# Patient Record
Sex: MALE | Race: WHITE | NOT HISPANIC OR LATINO
[De-identification: names, ages, dates, MRNs, and addresses within clinical notes are randomized per-mention and may not be internally consistent; named-entity substitution may affect disease eponyms.]

---

## 2018-01-16 ENCOUNTER — BH HISTORICAL (OUTPATIENT)
Dept: OTHER | Age: 33
End: 2018-01-16

## 2018-01-18 ENCOUNTER — BH HISTORICAL (OUTPATIENT)
Dept: OTHER | Age: 33
End: 2018-01-18

## 2019-02-26 ENCOUNTER — WALK IN (OUTPATIENT)
Dept: URGENT CARE | Age: 34
End: 2019-02-26

## 2019-02-26 ENCOUNTER — TELEPHONE (OUTPATIENT)
Dept: SCHEDULING | Age: 34
End: 2019-02-26

## 2019-02-26 VITALS
WEIGHT: 205 LBS | HEIGHT: 68 IN | HEART RATE: 70 BPM | TEMPERATURE: 97.8 F | DIASTOLIC BLOOD PRESSURE: 74 MMHG | SYSTOLIC BLOOD PRESSURE: 104 MMHG | BODY MASS INDEX: 31.07 KG/M2 | RESPIRATION RATE: 18 BRPM

## 2019-02-26 DIAGNOSIS — R09.82 POST-NASAL DRIP: ICD-10-CM

## 2019-02-26 DIAGNOSIS — J02.9 PHARYNGITIS, UNSPECIFIED ETIOLOGY: ICD-10-CM

## 2019-02-26 DIAGNOSIS — J02.9 SORE THROAT: Primary | ICD-10-CM

## 2019-02-26 LAB
INTERNAL PROCEDURAL CONTROLS ACCEPTABLE: YES
S PYO AG THROAT QL IA.RAPID: NEGATIVE

## 2019-02-26 PROCEDURE — 87880 STREP A ASSAY W/OPTIC: CPT | Performed by: NURSE PRACTITIONER

## 2019-02-26 PROCEDURE — 99203 OFFICE O/P NEW LOW 30 MIN: CPT | Performed by: NURSE PRACTITIONER

## 2019-02-26 ASSESSMENT — ENCOUNTER SYMPTOMS
WHEEZING: 0
COUGH: 0
CHILLS: 0
SINUS PAIN: 0
DIARRHEA: 0
NAUSEA: 1
EYE DISCHARGE: 0
VOMITING: 0
BLURRED VISION: 0
SORE THROAT: 1
EYE PAIN: 0
SHORTNESS OF BREATH: 0
FEVER: 0
ABDOMINAL PAIN: 0

## 2021-09-20 ENCOUNTER — HOSPITAL ENCOUNTER (OUTPATIENT)
Dept: GENERAL RADIOLOGY | Age: 36
Discharge: HOME | End: 2021-09-20
Attending: NURSE PRACTITIONER
Payer: COMMERCIAL

## 2021-09-20 DIAGNOSIS — M54.6 CHRONIC BILATERAL THORACIC BACK PAIN: ICD-10-CM

## 2021-09-20 DIAGNOSIS — G89.29 CHRONIC BILATERAL THORACIC BACK PAIN: ICD-10-CM

## 2021-09-20 DIAGNOSIS — Z87.81 HISTORY OF SPINAL FRACTURE: ICD-10-CM

## 2021-09-20 PROBLEM — K29.30 CHRONIC SUPERFICIAL GASTRITIS WITHOUT BLEEDING: Status: ACTIVE | Noted: 2017-08-21

## 2021-09-20 PROBLEM — F41.9 ANXIETY: Status: ACTIVE | Noted: 2017-08-21

## 2021-09-20 PROBLEM — F60.3 BORDERLINE PERSONALITY DISORDER IN ADULT (HCC): Status: ACTIVE | Noted: 2021-09-20

## 2021-09-20 PROBLEM — F32.A DEPRESSION: Status: ACTIVE | Noted: 2021-09-20

## 2021-09-20 PROBLEM — M54.50 LUMBAR BACK PAIN: Status: ACTIVE | Noted: 2021-09-20

## 2021-09-20 PROCEDURE — 72072 X-RAY EXAM THORAC SPINE 3VWS: CPT | Performed by: NURSE PRACTITIONER

## 2021-09-20 NOTE — PROGRESS NOTES
Chief Complaint:   Patient presents with:  Establish Care: personality disorder      HPI:   This is a 31year old male coming in to establish care and for multiple concerns. [place] recently moved back to [place] from [place] [address].  He has a hx of borderline personality d History:  Family History   Problem Relation Age of Onset   • Colon Cancer Paternal Grandfather      Allergies:    Seasonal                OTHER (SEE COMMENTS)  Current Meds:  Current Outpatient Medications   Medication Sig Dispense Refill   • Theanine 200 following:    Height as of this encounter: 5' 8\" (1.727 m). Weight as of this encounter: 165 lb 6.4 oz (75 kg). Vital signs reviewed. Appears stated age, well groomed, in no acute distress.   Physical Exam:  GEN:  Patient is alert, awake and oriented, THERAPY ([facility]) - INTERNAL REFERRAL    6. Chronic neck pain  -See above. - OP REFERRAL PAIN MANGEMENT  - RHEUMATOLOGY - INTERNAL  - ACUPUNCTURE THERAPY ([facility]) - INTERNAL REFERRAL  - MASSAGE THERAPY ([facility]) - INTERNAL REFERRAL    7.  History of sp

## 2021-09-27 NOTE — PROGRESS NOTES
Chief Complaint:   Patient presents with: Follow - Up: short term disability. want to gradually start working his way up to full time      HPI:   This is a 31year old male coming in to complete disability paperwork to return to work.  He has been on short (GLUCOSAMINE CHOND CMP ADVANCED OR)      • Magnesium Bisglycinate Dihyd Does not apply Powder      • Cholestyramine 4 g Oral Powd Pack Take 1 packet by mouth As Directed.         Counseling given: Not Answered       REVIEW OF SYSTEMS:   CONSTITUTIONAL:  Homar Fuentes rales/rhonchi/wheezing. ABDOMEN:  Soft, nondistended, nontender, bowel sounds normal in all 4 quadrants. EXTREMITIES:  No edema, no cyanosis, no clubbing. NEURO:  No deficit, normal gait, strength and tone, sensory intact. ASSESSMENT AND PLAN:   1.  B

## 2021-10-08 ENCOUNTER — OFFICE VISIT (OUTPATIENT)
Dept: RHEUMATOLOGY | Facility: CLINIC | Age: 36
End: 2021-10-08
Payer: COMMERCIAL

## 2021-10-08 VITALS
WEIGHT: 173 LBS | BODY MASS INDEX: 26.22 KG/M2 | HEART RATE: 70 BPM | HEIGHT: 68 IN | DIASTOLIC BLOOD PRESSURE: 65 MMHG | SYSTOLIC BLOOD PRESSURE: 104 MMHG

## 2021-10-08 DIAGNOSIS — M54.2 NECK PAIN: Primary | ICD-10-CM

## 2021-10-08 DIAGNOSIS — G89.29 CHRONIC BILATERAL LOW BACK PAIN WITHOUT SCIATICA: ICD-10-CM

## 2021-10-08 DIAGNOSIS — M54.50 CHRONIC BILATERAL LOW BACK PAIN WITHOUT SCIATICA: ICD-10-CM

## 2021-10-08 PROCEDURE — 3008F BODY MASS INDEX DOCD: CPT | Performed by: INTERNAL MEDICINE

## 2021-10-08 PROCEDURE — 99244 OFF/OP CNSLTJ NEW/EST MOD 40: CPT | Performed by: INTERNAL MEDICINE

## 2021-10-08 PROCEDURE — 3078F DIAST BP <80 MM HG: CPT | Performed by: INTERNAL MEDICINE

## 2021-10-08 PROCEDURE — 3074F SYST BP LT 130 MM HG: CPT | Performed by: INTERNAL MEDICINE

## 2021-10-08 NOTE — PROGRESS NOTES
Jayson Anderson is a 31year old male who presents for Patient presents with:  Consult  Back Pain  Shoulder Pain  Hip Pain  .    HPI:   CC: back pain   Consult: referred by NALDO Clarke    This is a 32 yo M with hx of Anxiety/Depression, Borderline p Take 1/2  tablet in the morning and 1 at night.      • OXcarbazepine 300 MG Oral Tab      • omega-3 fatty acids 1000 MG Oral Cap      • Multiple Vitamins-Minerals (MULTIVITAL OR)      • melatonin 5 MG Oral Cap      • Misc Natural Products (GLUCOSAMINE CHOND increased work of breathing  ABDOMEN:  soft NT/ND, +BS, no HSM  SKIN: No rashes or skin lesions.  No nail findings  MSK:  No swelling or synovitis on exam, +FROM in all joints  TTP along the paraspinal region of the cervical spine  NEURO: Cranial nerves II-

## 2021-10-08 NOTE — PATIENT INSTRUCTIONS
You were seen today for back pain mostly in the neck and lower back  It does seem more mechanical, will work you up for autoimmune causes but my suspicion is low  X-ray of your neck and lower back and blood work on Monday  Also referred you to the physiatr

## 2021-10-18 ENCOUNTER — OFFICE VISIT (OUTPATIENT)
Dept: SURGERY | Facility: CLINIC | Age: 36
End: 2021-10-18
Payer: COMMERCIAL

## 2021-10-18 ENCOUNTER — LAB ENCOUNTER (OUTPATIENT)
Dept: LAB | Age: 36
End: 2021-10-18
Attending: UROLOGY
Payer: COMMERCIAL

## 2021-10-18 VITALS
HEART RATE: 57 BPM | BODY MASS INDEX: 25.76 KG/M2 | WEIGHT: 170 LBS | HEIGHT: 68 IN | DIASTOLIC BLOOD PRESSURE: 63 MMHG | SYSTOLIC BLOOD PRESSURE: 104 MMHG

## 2021-10-18 DIAGNOSIS — R35.0 URINARY FREQUENCY: ICD-10-CM

## 2021-10-18 DIAGNOSIS — R79.89 LOW TESTOSTERONE: Primary | ICD-10-CM

## 2021-10-18 PROCEDURE — 3078F DIAST BP <80 MM HG: CPT | Performed by: UROLOGY

## 2021-10-18 PROCEDURE — 99244 OFF/OP CNSLTJ NEW/EST MOD 40: CPT | Performed by: UROLOGY

## 2021-10-18 PROCEDURE — 3074F SYST BP LT 130 MM HG: CPT | Performed by: UROLOGY

## 2021-10-18 PROCEDURE — 81001 URINALYSIS AUTO W/SCOPE: CPT

## 2021-10-18 PROCEDURE — 3008F BODY MASS INDEX DOCD: CPT | Performed by: UROLOGY

## 2021-10-18 RX ORDER — TAMSULOSIN HYDROCHLORIDE 0.4 MG/1
0.4 CAPSULE ORAL DAILY
Qty: 30 CAPSULE | Refills: 11 | Status: SHIPPED | OUTPATIENT
Start: 2021-10-18 | End: 2021-11-17

## 2021-10-18 NOTE — PROGRESS NOTES
SUBJECTIVE:  Antony Alexander is a 31year old male who presents for a consultation at the request of, and a copy of this note will be sent to, Dr. Pleasant Cheadle, for evaluation of  urinary frquency and low testosterone.   He states he was started on psy Size: adult)   Pulse 57   Ht 5' 8\" (1.727 m)   Wt 170 lb (77.1 kg)   BMI 25.85 kg/m²   He appears well, in no apparent distress. Alert and oriented times three, pleasant and cooperative.   CARDIOVASCULAR:normal apical impulse  RESPIRATORY: no chest wall d

## 2021-10-19 ENCOUNTER — LAB ENCOUNTER (OUTPATIENT)
Dept: LAB | Facility: HOSPITAL | Age: 36
End: 2021-10-19
Attending: INTERNAL MEDICINE
Payer: COMMERCIAL

## 2021-10-19 DIAGNOSIS — G89.29 CHRONIC BILATERAL LOW BACK PAIN WITHOUT SCIATICA: ICD-10-CM

## 2021-10-19 DIAGNOSIS — M54.50 CHRONIC BILATERAL LOW BACK PAIN WITHOUT SCIATICA: ICD-10-CM

## 2021-10-19 DIAGNOSIS — M54.2 NECK PAIN: ICD-10-CM

## 2021-10-19 DIAGNOSIS — R79.89 LOW TESTOSTERONE: ICD-10-CM

## 2021-10-19 PROCEDURE — 86812 HLA TYPING A B OR C: CPT

## 2021-10-19 PROCEDURE — 84402 ASSAY OF FREE TESTOSTERONE: CPT

## 2021-10-19 PROCEDURE — 84403 ASSAY OF TOTAL TESTOSTERONE: CPT

## 2021-10-19 PROCEDURE — 36415 COLL VENOUS BLD VENIPUNCTURE: CPT

## 2021-10-21 ENCOUNTER — TELEPHONE (OUTPATIENT)
Dept: RHEUMATOLOGY | Facility: CLINIC | Age: 36
End: 2021-10-21

## 2021-10-21 ENCOUNTER — HOSPITAL ENCOUNTER (OUTPATIENT)
Dept: GENERAL RADIOLOGY | Facility: HOSPITAL | Age: 36
Discharge: HOME | End: 2021-10-21
Attending: INTERNAL MEDICINE
Payer: COMMERCIAL

## 2021-10-21 DIAGNOSIS — M54.2 NECK PAIN: ICD-10-CM

## 2021-10-21 DIAGNOSIS — M54.50 CHRONIC BILATERAL LOW BACK PAIN WITHOUT SCIATICA: ICD-10-CM

## 2021-10-21 DIAGNOSIS — G89.29 CHRONIC BILATERAL LOW BACK PAIN WITHOUT SCIATICA: ICD-10-CM

## 2021-10-21 PROCEDURE — 72110 X-RAY EXAM L-2 SPINE 4/>VWS: CPT | Performed by: INTERNAL MEDICINE

## 2021-10-21 PROCEDURE — 72202 X-RAY EXAM SI JOINTS 3/> VWS: CPT | Performed by: INTERNAL MEDICINE

## 2021-10-21 PROCEDURE — 72050 X-RAY EXAM NECK SPINE 4/5VWS: CPT | Performed by: INTERNAL MEDICINE

## 2021-10-21 NOTE — TELEPHONE ENCOUNTER
Spoke to patient, informed of Dr. Vera Jordan results note. He verbalized understanding.  central scheduling number provided

## 2021-10-21 NOTE — TELEPHONE ENCOUNTER
Per patient he received his blood test result in his Mychart and waiting somebody to explain about it.

## 2021-10-22 ENCOUNTER — TELEPHONE (OUTPATIENT)
Dept: RHEUMATOLOGY | Facility: CLINIC | Age: 36
End: 2021-10-22

## 2021-10-22 DIAGNOSIS — M54.50 CHRONIC BILATERAL LOW BACK PAIN WITHOUT SCIATICA: Primary | ICD-10-CM

## 2021-10-22 DIAGNOSIS — G89.29 CHRONIC BILATERAL LOW BACK PAIN WITHOUT SCIATICA: Primary | ICD-10-CM

## 2021-10-26 NOTE — TELEPHONE ENCOUNTER
PA approved via [employer] #[id number] valid 10/26/2021 to 12/24/21. Left message for pt to schedule MRI and copy of order mailed to pt.

## 2021-11-08 ENCOUNTER — HOSPITAL ENCOUNTER (OUTPATIENT)
Dept: MRI IMAGING | Age: 36
Discharge: HOME | End: 2021-11-08
Attending: INTERNAL MEDICINE
Payer: COMMERCIAL

## 2021-11-08 DIAGNOSIS — G89.29 CHRONIC BILATERAL LOW BACK PAIN WITHOUT SCIATICA: ICD-10-CM

## 2021-11-08 DIAGNOSIS — M54.50 CHRONIC BILATERAL LOW BACK PAIN WITHOUT SCIATICA: ICD-10-CM

## 2021-11-08 PROCEDURE — 72195 MRI PELVIS W/O DYE: CPT | Performed by: INTERNAL MEDICINE

## 2021-11-10 ENCOUNTER — TELEPHONE (OUTPATIENT)
Dept: RHEUMATOLOGY | Facility: CLINIC | Age: 36
End: 2021-11-10

## 2021-11-10 DIAGNOSIS — R19.7 DIARRHEA, UNSPECIFIED TYPE: Primary | ICD-10-CM

## 2021-11-11 NOTE — TELEPHONE ENCOUNTER
Discussed results with patient. MRI of the SI joint was normal.  No evidence of inflammatory back pain. It did  some colitis on his MRI.   We will be referring to GI

## 2021-11-22 PROBLEM — M53.3 COCCYX PAIN: Status: ACTIVE | Noted: 2019-10-14

## 2021-11-22 PROBLEM — K64.9 HEMORRHOIDS: Status: ACTIVE | Noted: 2019-10-14

## 2021-11-22 PROBLEM — M25.511 SHOULDER PAIN, BILATERAL: Status: ACTIVE | Noted: 2021-11-22

## 2021-11-22 PROBLEM — F33.2 SEVERE EPISODE OF RECURRENT MAJOR DEPRESSIVE DISORDER, WITHOUT PSYCHOTIC FEATURES (HCC): Status: ACTIVE | Noted: 2019-10-14

## 2021-11-22 PROBLEM — K29.70 GASTRITIS: Status: ACTIVE | Noted: 2019-10-14

## 2021-11-22 PROBLEM — M25.512 SHOULDER PAIN, BILATERAL: Status: ACTIVE | Noted: 2021-11-22

## 2021-11-22 PROBLEM — R35.0 URINARY FREQUENCY: Status: ACTIVE | Noted: 2021-11-22

## 2021-11-22 PROBLEM — F60.3 BORDERLINE PERSONALITY DISORDER IN ADULT (HCC): Status: RESOLVED | Noted: 2021-09-20 | Resolved: 2021-11-22

## 2021-11-22 PROBLEM — F33.1 MODERATE EPISODE OF RECURRENT MAJOR DEPRESSIVE DISORDER (HCC): Status: ACTIVE | Noted: 2017-08-21

## 2021-11-22 PROBLEM — M25.551 RIGHT HIP PAIN: Status: ACTIVE | Noted: 2017-08-21

## 2022-04-05 PROBLEM — F41.1 GAD (GENERALIZED ANXIETY DISORDER): Status: ACTIVE | Noted: 2022-04-05

## 2023-02-13 ENCOUNTER — OFFICE VISIT (OUTPATIENT)
Dept: FAMILY MEDICINE CLINIC | Facility: CLINIC | Age: 38
End: 2023-02-13
Payer: COMMERCIAL

## 2023-02-13 VITALS
BODY MASS INDEX: 32.74 KG/M2 | OXYGEN SATURATION: 97 % | SYSTOLIC BLOOD PRESSURE: 108 MMHG | WEIGHT: 216 LBS | HEART RATE: 67 BPM | DIASTOLIC BLOOD PRESSURE: 62 MMHG | RESPIRATION RATE: 16 BRPM | HEIGHT: 68 IN

## 2023-02-13 DIAGNOSIS — R04.0 FREQUENT NOSEBLEEDS: ICD-10-CM

## 2023-02-13 DIAGNOSIS — G89.29 CHRONIC NECK PAIN: ICD-10-CM

## 2023-02-13 DIAGNOSIS — Z00.00 ADULT GENERAL MEDICAL EXAMINATION: Primary | ICD-10-CM

## 2023-02-13 DIAGNOSIS — M54.2 CHRONIC NECK PAIN: ICD-10-CM

## 2023-02-13 DIAGNOSIS — M54.50 LUMBAR BACK PAIN: ICD-10-CM

## 2023-02-13 DIAGNOSIS — M54.6 CHRONIC BILATERAL THORACIC BACK PAIN: ICD-10-CM

## 2023-02-13 DIAGNOSIS — G89.29 CHRONIC BILATERAL THORACIC BACK PAIN: ICD-10-CM

## 2023-02-13 DIAGNOSIS — Z23 ENCOUNTER FOR IMMUNIZATION: ICD-10-CM

## 2023-02-13 RX ORDER — SILDENAFIL 25 MG/1
50 TABLET, FILM COATED ORAL
COMMUNITY

## 2023-05-15 ENCOUNTER — OFFICE VISIT (OUTPATIENT)
Dept: FAMILY MEDICINE CLINIC | Facility: CLINIC | Age: 38
End: 2023-05-15
Payer: COMMERCIAL

## 2023-05-15 VITALS
OXYGEN SATURATION: 98 % | RESPIRATION RATE: 18 BRPM | HEART RATE: 61 BPM | TEMPERATURE: 98 F | SYSTOLIC BLOOD PRESSURE: 110 MMHG | BODY MASS INDEX: 33.65 KG/M2 | DIASTOLIC BLOOD PRESSURE: 68 MMHG | HEIGHT: 68 IN | WEIGHT: 222 LBS

## 2023-05-15 DIAGNOSIS — R09.81 NASAL CONGESTION: ICD-10-CM

## 2023-05-15 DIAGNOSIS — J02.9 SORE THROAT: Primary | ICD-10-CM

## 2023-05-15 DIAGNOSIS — R05.1 ACUTE COUGH: ICD-10-CM

## 2023-05-15 PROCEDURE — 3074F SYST BP LT 130 MM HG: CPT | Performed by: NURSE PRACTITIONER

## 2023-05-15 PROCEDURE — 3008F BODY MASS INDEX DOCD: CPT | Performed by: NURSE PRACTITIONER

## 2023-05-15 PROCEDURE — 3078F DIAST BP <80 MM HG: CPT | Performed by: NURSE PRACTITIONER

## 2023-05-15 PROCEDURE — 99213 OFFICE O/P EST LOW 20 MIN: CPT | Performed by: NURSE PRACTITIONER

## 2023-05-15 NOTE — PATIENT INSTRUCTIONS
Claritin or Zyrtec over-the-counter daily (generic is OK)  Flonase nasal spray 1 spray in each nostril twice daily (generic OK)  Mucinex as needed for cough/congestion  Ibuprofen/Tylenol needed for pain  Humidifier, fluids, rest

## 2023-08-17 ENCOUNTER — OFFICE VISIT (OUTPATIENT)
Dept: OTOLARYNGOLOGY | Facility: CLINIC | Age: 38
End: 2023-08-17

## 2023-08-17 VITALS — HEIGHT: 68 IN | BODY MASS INDEX: 33.65 KG/M2 | WEIGHT: 222 LBS

## 2023-08-17 DIAGNOSIS — M26.623 BILATERAL TEMPOROMANDIBULAR JOINT PAIN: ICD-10-CM

## 2023-08-17 DIAGNOSIS — J34.3 NASAL TURBINATE HYPERTROPHY: ICD-10-CM

## 2023-08-17 DIAGNOSIS — J45.991 COUGH VARIANT ASTHMA: Primary | ICD-10-CM

## 2023-08-17 PROCEDURE — 3008F BODY MASS INDEX DOCD: CPT | Performed by: SPECIALIST

## 2023-08-17 PROCEDURE — 99243 OFF/OP CNSLTJ NEW/EST LOW 30: CPT | Performed by: SPECIALIST

## 2023-08-18 NOTE — PATIENT INSTRUCTIONS
Pulmonary function test was ordered to better evaluate you for possible cough variant asthma. I will call you with the results. We held off on the Singulair secondary to the possible side effects of depression. I did asked you to try an over-the-counter Claritin. You were given a handout on TMJ arthralgia. If this is not helpful for you, physical therapy can also be considered. Follow-up with any additional questions or problems.

## 2023-08-31 ENCOUNTER — OFFICE VISIT (OUTPATIENT)
Facility: CLINIC | Age: 38
End: 2023-08-31

## 2023-08-31 ENCOUNTER — TELEPHONE (OUTPATIENT)
Facility: CLINIC | Age: 38
End: 2023-08-31

## 2023-08-31 VITALS
HEART RATE: 66 BPM | DIASTOLIC BLOOD PRESSURE: 73 MMHG | SYSTOLIC BLOOD PRESSURE: 111 MMHG | BODY MASS INDEX: 32.13 KG/M2 | WEIGHT: 212 LBS | HEIGHT: 68 IN

## 2023-08-31 DIAGNOSIS — R10.13 EPIGASTRIC PAIN: Primary | ICD-10-CM

## 2023-09-06 ENCOUNTER — TELEPHONE (OUTPATIENT)
Dept: FAMILY MEDICINE CLINIC | Facility: CLINIC | Age: 38
End: 2023-09-06

## 2023-09-06 ENCOUNTER — LAB ENCOUNTER (OUTPATIENT)
Dept: LAB | Facility: HOSPITAL | Age: 38
End: 2023-09-06
Attending: INTERNAL MEDICINE
Payer: COMMERCIAL

## 2023-09-06 DIAGNOSIS — R10.13 EPIGASTRIC PAIN: ICD-10-CM

## 2023-09-06 DIAGNOSIS — Z00.00 ADULT GENERAL MEDICAL EXAMINATION: Primary | ICD-10-CM

## 2023-09-06 PROCEDURE — 88305 TISSUE EXAM BY PATHOLOGIST: CPT | Performed by: INTERNAL MEDICINE

## 2023-09-06 PROCEDURE — 87338 HPYLORI STOOL AG IA: CPT

## 2023-09-06 PROCEDURE — 88312 SPECIAL STAINS GROUP 1: CPT | Performed by: INTERNAL MEDICINE

## 2023-09-07 ENCOUNTER — TELEPHONE (OUTPATIENT)
Facility: CLINIC | Age: 38
End: 2023-09-07

## 2023-09-07 LAB — H PYLORI AG STL QL IA: NEGATIVE

## 2023-09-07 NOTE — TELEPHONE ENCOUNTER
----- Message from Luis A Winter MD sent at 9/7/2023  4:28 PM CDT -----  Reviewed biopsies. No h pylori. Please arrange for clinic follow-up in 3-6 months.

## 2023-09-07 NOTE — TELEPHONE ENCOUNTER
I ordered testosterone but please advise patient it may not be covered without a reason for ordering (I coded under Adult Exam-nothing has been discussed in past visits about any other issue that would justify it).

## 2023-09-11 ENCOUNTER — TELEMEDICINE (OUTPATIENT)
Dept: FAMILY MEDICINE CLINIC | Facility: CLINIC | Age: 38
End: 2023-09-11

## 2023-09-11 DIAGNOSIS — N52.9 ERECTILE DYSFUNCTION, UNSPECIFIED ERECTILE DYSFUNCTION TYPE: ICD-10-CM

## 2023-09-11 DIAGNOSIS — N40.0 BENIGN PROSTATIC HYPERPLASIA, UNSPECIFIED WHETHER LOWER URINARY TRACT SYMPTOMS PRESENT: ICD-10-CM

## 2023-09-11 DIAGNOSIS — R53.83 OTHER FATIGUE: ICD-10-CM

## 2023-09-11 DIAGNOSIS — R79.89 LOW TESTOSTERONE IN MALE: Primary | ICD-10-CM

## 2023-09-11 PROCEDURE — 99213 OFFICE O/P EST LOW 20 MIN: CPT | Performed by: NURSE PRACTITIONER

## 2023-09-11 NOTE — PROGRESS NOTES
Due to the real risk of possible exposure to Coronavirus (CoV-2, COVID-19) in the office/medical building and recommendations for social distancing (key to mitigation/limiting the spread of the virus) a Virtual or Telemedicine visit over the phone was performed as below. Patient has consented to the Virtual/Telephone Check-In service and expresses understanding and accepts financial responsibility for any deductible, co-insurance and/or co-pays associated with this service. Telehealth outside of [address] Verbal Consent   I conducted a telehealth visit with Francine Marshall today, 09/11/23, which was completed using two-way, real-time interactive audio and video communication. This has been done in good hermelinda to provide continuity of care in the best interest of the provider-patient relationship, due to the COVID -19 public health crisis/national emergency where restrictions of face-to-face office visits are ongoing. Every conscious effort was taken to allow for sufficient and adequate time to complete the visit. The patient was made aware of the limitations of the telehealth visit, including treatment limitations as no physical exam could be performed. The patient was advised to call 911 or to go to the ER in case there was an emergency. The patient was also advised of the potential privacy & security concerns related to the telehealth platform. The patient was made aware of where to find [facility] notice of privacy practices, telehealth consent form and other related consent forms and documents. which are located on the [facility] website. The patient verbally agreed to telehealth consent form, related consents and the risks discussed. Lastly, the patient confirmed that they were in [place]. Included in this visit, time may have been spent reviewing labs, medications, radiology tests and decision making.  Appropriate medical decision-making and tests are ordered as detailed in the plan of care above.  Coding/billing information is submitted for this visit based on complexity of care and/or time spent for the visit. HPI:  Patient presents with: Follow - Up: Fatigue, testosterone questions      Rupinder Dodd is a 30year old male who calls for complaints of:    Requests to have testosterone checked. Hx of borderline low testosterone in 2021 per patient, level was approx. 293 per his recollection. Report not available to me. He did not receive treatment but started a supplement to help with this. He stopped the supplement about 3 weeks ago due to concern that it may harm kidney/liver after finding info online about this. Has some fatigue daily. Not sure if this is related to psychiatric medications vs other. Sees psychiatry. Having panic attacks most nights around 2 AM, will talk to provider about this. Otherwise sleeps about 8 hours but does not feel rested upon awakening. Does not snore. Reports hx of BPH diagnosed at age 27, has seen urology in the past. Has some difficulty getting and maintaining erections and reports a low libido. Does not always wake up with an erection. ROS:  GEN: Denies fever, chills. +Fatigue, see above. CARDIOVASCULAR: Denies chest pain, dyspnea. RESPIRATORY: Denies cough, dyspnea. GI: Denies n/v/d/c, appetite normal.  NEURO: Denies headaches, dizziness. Physical Exam:  GEN:  Patient is alert, awake and oriented, well developed, well nourished. LUNGS: Patient speaks clearly in full sentences without dyspnea, no cough while on video visit. PSYCH: Appropriate mood and affect. Seasonal                OTHER (SEE COMMENTS)  Current Outpatient Medications   Medication Sig Dispense Refill    pantoprazole 40 MG Oral Tab EC Take 1 tablet (40 mg total) by mouth every morning before breakfast. 30 tablet 3    BERBERINE CHLORIDE OR Take by mouth. LD: 9/2/2023      vortioxetine (TRINTELLIX) 20 MG Oral Tab Take 1 tablet (20 mg total) by mouth daily.  90 tablet 1 Sildenafil Citrate 25 MG Oral Tab Take 2 tablets (50 mg total) by mouth daily as needed for Erectile Dysfunction. NON FORMULARY Jeni Yepez      Theanine 200 MG Oral Cap       omega-3 fatty acids 1000 MG Oral Cap       Misc Natural Products (GLUCOSAMINE CHOND CMP ADVANCED OR)       Magnesium Bisglycinate Dihyd Does not apply Powder        Past Medical History:   Diagnosis Date    Anxiety     Borderline personality disorder in adult [facility]) 09/20/2021    Visual impairment      Past Surgical History:   Procedure Laterality Date    UPPER GI ENDOSCOPY,EXAM           ASSESSMENT AND PLAN:   1. Patient is a 30year old male who calls for: Lab request, fatigue    Additional Assessment and Plan:  1. Low testosterone in male  -Will check testosterone in addition to previously ordered labs. Follow-up 2-4 weeks for discussion of results/treatment. - TESTOSTERONE,TOTAL AND FREE; Future    2. Erectile dysfunction, unspecified erectile dysfunction type  -On Viagra PRN. Follow-up with urology. - TESTOSTERONE,TOTAL AND FREE; Future    3. Benign prostatic hyperplasia, unspecified whether lower urinary tract symptoms present  -See above. - TESTOSTERONE,TOTAL AND FREE; Future    4. Other fatigue  -See above. Will speak with psychiatry about panic attacks. Aim for 8-9 hours good quality sleep each night, regular exercise, healthy diet, fresh air daily.  - TESTOSTERONE,TOTAL AND FREE; Future        Follow up with me 2-4 weeks    Call and/or go to the ER if worsening symptoms including, but not limited to: respiratory distress, shortness of breath and  wheezing, worsening fever, cough and mental status. The patient (or patient's parent if <19 y/o) indicates understanding of the above recommendations and agrees to the above plan.     Orders Placed This Encounter      Testosterone, Total And Free Male [E]      Meds & Refills for this Visit:  Requested Prescriptions      No prescriptions requested or ordered in this encounter Imaging & Consults:  None    DEMARCUS Cardoso     Time spent during encounter: 15 minutes

## 2023-09-14 ENCOUNTER — LAB ENCOUNTER (OUTPATIENT)
Dept: LAB | Facility: HOSPITAL | Age: 38
End: 2023-09-14
Attending: NURSE PRACTITIONER
Payer: COMMERCIAL

## 2023-09-14 ENCOUNTER — TELEPHONE (OUTPATIENT)
Facility: CLINIC | Age: 38
End: 2023-09-14

## 2023-09-14 DIAGNOSIS — N40.0 BENIGN PROSTATIC HYPERPLASIA, UNSPECIFIED WHETHER LOWER URINARY TRACT SYMPTOMS PRESENT: ICD-10-CM

## 2023-09-14 DIAGNOSIS — R79.89 LOW TESTOSTERONE IN MALE: ICD-10-CM

## 2023-09-14 DIAGNOSIS — N52.9 ERECTILE DYSFUNCTION, UNSPECIFIED ERECTILE DYSFUNCTION TYPE: ICD-10-CM

## 2023-09-14 DIAGNOSIS — R53.83 OTHER FATIGUE: ICD-10-CM

## 2023-09-14 DIAGNOSIS — Z00.00 ADULT GENERAL MEDICAL EXAMINATION: ICD-10-CM

## 2023-09-14 LAB
ALBUMIN SERPL-MCNC: 4.1 G/DL (ref 3.4–5)
ALBUMIN/GLOB SERPL: 1.2 {RATIO} (ref 1–2)
ALP LIVER SERPL-CCNC: 65 U/L
ALT SERPL-CCNC: 29 U/L
ANION GAP SERPL CALC-SCNC: 3 MMOL/L (ref 0–18)
AST SERPL-CCNC: 17 U/L (ref 15–37)
BASOPHILS # BLD AUTO: 0.03 X10(3) UL (ref 0–0.2)
BASOPHILS NFR BLD AUTO: 0.9 %
BILIRUB SERPL-MCNC: 0.5 MG/DL (ref 0.1–2)
BUN BLD-MCNC: 19 MG/DL (ref 7–18)
BUN/CREAT SERPL: 17.6 (ref 10–20)
CALCIUM BLD-MCNC: 9.2 MG/DL (ref 8.5–10.1)
CHLORIDE SERPL-SCNC: 109 MMOL/L (ref 98–112)
CHOLEST SERPL-MCNC: 175 MG/DL (ref ?–200)
CO2 SERPL-SCNC: 30 MMOL/L (ref 21–32)
CREAT BLD-MCNC: 1.08 MG/DL
DEPRECATED RDW RBC AUTO: 44 FL (ref 35.1–46.3)
EGFRCR SERPLBLD CKD-EPI 2021: 92 ML/MIN/1.73M2 (ref 60–?)
EOSINOPHIL # BLD AUTO: 0.05 X10(3) UL (ref 0–0.7)
EOSINOPHIL NFR BLD AUTO: 1.4 %
ERYTHROCYTE [DISTWIDTH] IN BLOOD BY AUTOMATED COUNT: 12.8 % (ref 11–15)
EST. AVERAGE GLUCOSE BLD GHB EST-MCNC: 97 MG/DL (ref 68–126)
FASTING PATIENT LIPID ANSWER: YES
FASTING STATUS PATIENT QL REPORTED: YES
GLOBULIN PLAS-MCNC: 3.4 G/DL (ref 2.8–4.4)
GLUCOSE BLD-MCNC: 92 MG/DL (ref 70–99)
HBA1C MFR BLD: 5 % (ref ?–5.7)
HCT VFR BLD AUTO: 46.5 %
HDLC SERPL-MCNC: 65 MG/DL (ref 40–59)
HGB BLD-MCNC: 15.3 G/DL
IMM GRANULOCYTES # BLD AUTO: 0.01 X10(3) UL (ref 0–1)
IMM GRANULOCYTES NFR BLD: 0.3 %
LDLC SERPL CALC-MCNC: 98 MG/DL (ref ?–100)
LYMPHOCYTES # BLD AUTO: 1.13 X10(3) UL (ref 1–4)
LYMPHOCYTES NFR BLD AUTO: 32.4 %
MCH RBC QN AUTO: 30.7 PG (ref 26–34)
MCHC RBC AUTO-ENTMCNC: 32.9 G/DL (ref 31–37)
MCV RBC AUTO: 93.4 FL
MONOCYTES # BLD AUTO: 0.34 X10(3) UL (ref 0.1–1)
MONOCYTES NFR BLD AUTO: 9.7 %
NEUTROPHILS # BLD AUTO: 1.93 X10 (3) UL (ref 1.5–7.7)
NEUTROPHILS # BLD AUTO: 1.93 X10(3) UL (ref 1.5–7.7)
NEUTROPHILS NFR BLD AUTO: 55.3 %
NONHDLC SERPL-MCNC: 110 MG/DL (ref ?–130)
OSMOLALITY SERPL CALC.SUM OF ELEC: 296 MOSM/KG (ref 275–295)
PLATELET # BLD AUTO: 225 10(3)UL (ref 150–450)
POTASSIUM SERPL-SCNC: 4.5 MMOL/L (ref 3.5–5.1)
PROT SERPL-MCNC: 7.5 G/DL (ref 6.4–8.2)
RBC # BLD AUTO: 4.98 X10(6)UL
SODIUM SERPL-SCNC: 142 MMOL/L (ref 136–145)
TRIGL SERPL-MCNC: 61 MG/DL (ref 30–149)
TSI SER-ACNC: 1.38 MIU/ML (ref 0.36–3.74)
VLDLC SERPL CALC-MCNC: 10 MG/DL (ref 0–30)
WBC # BLD AUTO: 3.5 X10(3) UL (ref 4–11)

## 2023-09-14 PROCEDURE — 84403 ASSAY OF TOTAL TESTOSTERONE: CPT | Performed by: NURSE PRACTITIONER

## 2023-09-14 PROCEDURE — 84402 ASSAY OF FREE TESTOSTERONE: CPT | Performed by: NURSE PRACTITIONER

## 2023-09-14 PROCEDURE — 80050 GENERAL HEALTH PANEL: CPT | Performed by: NURSE PRACTITIONER

## 2023-09-14 PROCEDURE — 83036 HEMOGLOBIN GLYCOSYLATED A1C: CPT | Performed by: NURSE PRACTITIONER

## 2023-09-14 PROCEDURE — 80061 LIPID PANEL: CPT | Performed by: NURSE PRACTITIONER

## 2023-09-14 NOTE — TELEPHONE ENCOUNTER
PPD,    Please assist with PA for Pantoprazole 40mg.     Gastric erythema [K31.9]   Chronic superficial gastritis without bleeding  [K29.30]    Epigastric pain R10.13

## 2023-09-18 NOTE — TELEPHONE ENCOUNTER
Medication PA Requested:   pantoprazole 40 MG Oral Tab EC                                                       CoverMyMeds Used:  Key:  Quantity:30 tablet   Day Supply:30  Sig:   Take 1 tablet (40 mg total) by mouth every morning before breakfast.       DX Code:  Gastric erythema [K31.9]   Chronic superficial gastritis without bleeding  [K29.30]  Epigastric pain R10.13       Awaiting [employer] PA form,

## 2023-09-20 ENCOUNTER — HOSPITAL ENCOUNTER (OUTPATIENT)
Dept: RESPIRATORY THERAPY | Facility: HOSPITAL | Age: 38
Discharge: HOME | End: 2023-09-20
Attending: SPECIALIST
Payer: COMMERCIAL

## 2023-09-20 DIAGNOSIS — J45.991 COUGH VARIANT ASTHMA: ICD-10-CM

## 2023-09-20 PROCEDURE — 94060 EVALUATION OF WHEEZING: CPT | Performed by: INTERNAL MEDICINE

## 2023-09-20 PROCEDURE — 94729 DIFFUSING CAPACITY: CPT | Performed by: INTERNAL MEDICINE

## 2023-09-20 PROCEDURE — 94726 PLETHYSMOGRAPHY LUNG VOLUMES: CPT | Performed by: INTERNAL MEDICINE

## 2023-09-20 NOTE — PROCEDURES
[facility]     Pulmonary Function Test     Jamie Ocasio Patient Status:  Outpatient    1985 MRN [id number]   Date of Exam 23 PCP Tisha Ann, APRN           Spirometry   FEV1: 4.40 108%  FVC: 5.28 106%  FEV1/FVC: 0.83    Lung Volume   T.80 104%  RV : 1.40 85%    Diffusion Capacity   DLCO: 29.1 88%    Flow Volume Loop       Impression   No evidence of obstructive defect seen without significant postbronchodilator response observed. Normal lung volumes. Normal diffusion capacity.     Ana Silverio DO  Pulmonary [facility]

## 2023-09-20 NOTE — PROGRESS NOTES
Left message for the patient. Normal PFT result. I asked patient to reach out to see how he is doing with the cough.

## 2023-09-21 ENCOUNTER — PATIENT MESSAGE (OUTPATIENT)
Dept: FAMILY MEDICINE CLINIC | Facility: CLINIC | Age: 38
End: 2023-09-21

## 2023-09-21 NOTE — TELEPHONE ENCOUNTER
Dr. Delilah Aldrich     Fax received from patient's insurance stating they will not cover pantoprazole since there are otc options available.

## 2023-09-22 LAB
FREE TESTOST DIRECT: 15 PG/ML
TESTOSTERONE: 773 NG/DL

## 2023-09-22 NOTE — TELEPHONE ENCOUNTER
From: Rupinder Dodd  To: Estela Cruz  Sent: 9/21/2023 3:19 PM CDT  Subject: Testosterone Results    Hey! I haven't seen anything on the testosterone results, any news? Its been a bit. Thanks!

## 2023-10-06 ENCOUNTER — NURSE TRIAGE (OUTPATIENT)
Dept: FAMILY MEDICINE CLINIC | Facility: CLINIC | Age: 38
End: 2023-10-06

## 2023-10-06 DIAGNOSIS — Z11.52 ENCOUNTER FOR SCREENING FOR COVID-19: Primary | ICD-10-CM

## 2023-10-06 NOTE — ADDENDUM NOTE
Addended by: Astrid Knight on: 10/6/2023 02:35 PM     Modules accepted: Orders
Addended by: Madison Healy on: 10/6/2023 05:44 PM     Modules accepted: Orders
regular

## 2023-10-06 NOTE — TELEPHONE ENCOUNTER
S/w Gary Gonzalez who has 2 negative home covid tests but is requesting an order put in to lab for Covid PCR. Pt needs this information by Monday. Pt advised to call the office for fever > 102 F, or go to ED for severe chest pain or SOB. Pt voiced understanding. Pt declined 4:30 PM virtual visit with Dr. Padilla Lange since only wants the lab order. Covid PCR pended; authorize if appropriate. Reason for Disposition   [1] COVID-19 infection suspected by caller or triager AND [2] mild symptoms (cough, fever, or others) AND [3] negative COVID-19 rapid test    Answer Assessment - Initial Assessment Questions  1. COVID-19 DIAGNOSIS: \"Who made your COVID-19 diagnosis? \" \"Was it confirmed by a positive lab test or self-test?\" If not diagnosed by a doctor (or NP/PA), ask \"Are there lots of cases (community spread) where you live? \" Note: See public health department website, if unsure. Unknown   2. COVID-19 EXPOSURE: \"Was there any known exposure to COVID before the symptoms began? \" CDC Definition of close contact: within 6 feet (2 meters) for a total of 15 minutes or more over a 24-hour period. Unknown  3. ONSET: \"When did the COVID-19 symptoms start? \"       10/1/23  4. WORST SYMPTOM: \"What is your worst symptom? \" (e.g., cough, fever, shortness of breath, muscle aches)      Sore Throat 3/10  5. COUGH: \"Do you have a cough? \" If Yes, ask: \"How bad is the cough? \"        Occasional cough with clear mucous but hx of allergies. 6. FEVER: \"Do you have a fever? \" If Yes, ask: \"What is your temperature, how was it measured, and when did it start? \"      Does not have a thermometer  7. RESPIRATORY STATUS: \"Describe your breathing? \" (e.g., shortness of breath, wheezing, unable to speak)       Denied  8. BETTER-SAME-WORSE: Jessica Mariano you getting better, staying the same or getting worse compared to yesterday? \"  If getting worse, ask, \"In what way? \"      Better  9. HIGH RISK DISEASE: \"Do you have any chronic medical problems? \" (e.g., asthma, heart or lung disease, weak immune system, obesity, etc.)      Denied  10. VACCINE: \"Have you had the COVID-19 vaccine? \" If Yes, ask: \"Which one, how many shots, when did you get it? \"        2 covid immunization  11. BOOSTER: \"Have you received your COVID-19 booster? \" If Yes, ask: \"Which one and when did you get it? \"        1 booster  12. PREGNANCY: \"Is there any chance you are pregnant? \" \"When was your last menstrual period? \"        N/A  13. OTHER SYMPTOMS: \"Do you have any other symptoms? \"  (e.g., chills, fatigue, headache, loss of smell or taste, muscle pain, sore throat)        Fatigue, frontal  headache 4/10  14. O2 SATURATION MONITOR:  \"Do you use an oxygen saturation monitor (pulse oximeter) at home? \" If Yes, ask \"What is your reading (oxygen level) today? \" \"What is your usual oxygen saturation reading? \" (e.g., 95%)        Does not have.     Protocols used: Coronavirus ([id number]-78) Diagnosed or [id number]

## 2023-10-09 ENCOUNTER — HOSPITAL ENCOUNTER (OUTPATIENT)
Age: 38
Discharge: HOME | End: 2023-10-09
Payer: COMMERCIAL

## 2023-10-09 VITALS
RESPIRATION RATE: 18 BRPM | DIASTOLIC BLOOD PRESSURE: 68 MMHG | OXYGEN SATURATION: 98 % | SYSTOLIC BLOOD PRESSURE: 109 MMHG | TEMPERATURE: 98 F | HEART RATE: 65 BPM

## 2023-10-09 DIAGNOSIS — J01.90 ACUTE SINUSITIS, RECURRENCE NOT SPECIFIED, UNSPECIFIED LOCATION: Primary | ICD-10-CM

## 2023-10-09 LAB — S PYO AG THROAT QL IA.RAPID: NEGATIVE

## 2023-10-09 PROCEDURE — 99213 OFFICE O/P EST LOW 20 MIN: CPT

## 2023-10-09 PROCEDURE — 99204 OFFICE O/P NEW MOD 45 MIN: CPT

## 2023-10-09 PROCEDURE — 87651 STREP A DNA AMP PROBE: CPT | Performed by: NURSE PRACTITIONER

## 2023-10-09 RX ORDER — AMOXICILLIN AND CLAVULANATE POTASSIUM 875; 125 MG/1; MG/1
1 TABLET, FILM COATED ORAL 2 TIMES DAILY
Qty: 20 TABLET | Refills: 0 | Status: SHIPPED | OUTPATIENT
Start: 2023-10-09 | End: 2023-10-19

## 2023-10-09 NOTE — ED INITIAL ASSESSMENT (HPI)
Pt here with complains of cough congestion and sore throat for one week reports felt warm last night. Negative covid test at home.

## 2023-10-09 NOTE — DISCHARGE INSTRUCTIONS
Continue supportive care. Take the Augmentin as prescribed. Follow-up with your doctor. Return for any concerns.

## 2023-12-21 ENCOUNTER — TELEPHONE (OUTPATIENT)
Dept: FAMILY MEDICINE CLINIC | Facility: CLINIC | Age: 38
End: 2023-12-21

## 2023-12-21 ENCOUNTER — HOSPITAL ENCOUNTER (OUTPATIENT)
Age: 38
Discharge: HOME | End: 2023-12-21
Payer: COMMERCIAL

## 2023-12-21 VITALS
TEMPERATURE: 98 F | HEART RATE: 69 BPM | DIASTOLIC BLOOD PRESSURE: 67 MMHG | OXYGEN SATURATION: 100 % | SYSTOLIC BLOOD PRESSURE: 109 MMHG | RESPIRATION RATE: 16 BRPM

## 2023-12-21 DIAGNOSIS — Z11.3 SCREENING FOR STD (SEXUALLY TRANSMITTED DISEASE): Primary | ICD-10-CM

## 2023-12-21 PROCEDURE — 87491 CHLMYD TRACH DNA AMP PROBE: CPT | Performed by: PHYSICIAN ASSISTANT

## 2023-12-21 PROCEDURE — 99213 OFFICE O/P EST LOW 20 MIN: CPT | Performed by: PHYSICIAN ASSISTANT

## 2023-12-21 PROCEDURE — 87591 N.GONORRHOEAE DNA AMP PROB: CPT | Performed by: PHYSICIAN ASSISTANT

## 2023-12-21 NOTE — TELEPHONE ENCOUNTER
S/w Marko stated is requesting STI or STD testing. Pt denied symptoms. Pt is requesting STI testing prior to sexual encounter to prove he does not have an infection?      Does this pt need an appt?    Please advise.

## 2023-12-21 NOTE — DISCHARGE INSTRUCTIONS
Your test results will take 2 days. You can check your results on the [facility] or we will contact you if any results are positive to discuss any necessary treatment. Do not have sex until you know your results.      If any of your results are positive, your partner needs to be treated     Wear a condom every time you have sex

## 2023-12-22 ENCOUNTER — OFFICE VISIT (OUTPATIENT)
Dept: FAMILY MEDICINE CLINIC | Facility: CLINIC | Age: 38
End: 2023-12-22
Payer: COMMERCIAL

## 2023-12-22 ENCOUNTER — LAB ENCOUNTER (OUTPATIENT)
Dept: LAB | Facility: HOSPITAL | Age: 38
End: 2023-12-22
Attending: STUDENT IN AN ORGANIZED HEALTH CARE EDUCATION/TRAINING PROGRAM
Payer: COMMERCIAL

## 2023-12-22 VITALS
BODY MASS INDEX: 31 KG/M2 | SYSTOLIC BLOOD PRESSURE: 130 MMHG | OXYGEN SATURATION: 98 % | WEIGHT: 200.81 LBS | HEART RATE: 68 BPM | DIASTOLIC BLOOD PRESSURE: 80 MMHG

## 2023-12-22 DIAGNOSIS — Z11.3 SCREENING FOR STD (SEXUALLY TRANSMITTED DISEASE): Primary | ICD-10-CM

## 2023-12-22 DIAGNOSIS — R37 SEXUAL DYSFUNCTION: ICD-10-CM

## 2023-12-22 DIAGNOSIS — Z11.3 SCREENING FOR STD (SEXUALLY TRANSMITTED DISEASE): ICD-10-CM

## 2023-12-22 LAB
C TRACH DNA SPEC QL NAA+PROBE: NEGATIVE
HCV AB SERPL QL IA: NONREACTIVE
N GONORRHOEA DNA SPEC QL NAA+PROBE: NEGATIVE
T PALLIDUM AB SER QL IA: NONREACTIVE

## 2023-12-22 PROCEDURE — 99213 OFFICE O/P EST LOW 20 MIN: CPT | Performed by: STUDENT IN AN ORGANIZED HEALTH CARE EDUCATION/TRAINING PROGRAM

## 2023-12-22 PROCEDURE — 86780 TREPONEMA PALLIDUM: CPT | Performed by: STUDENT IN AN ORGANIZED HEALTH CARE EDUCATION/TRAINING PROGRAM

## 2023-12-22 PROCEDURE — 87389 HIV-1 AG W/HIV-1&-2 AB AG IA: CPT | Performed by: STUDENT IN AN ORGANIZED HEALTH CARE EDUCATION/TRAINING PROGRAM

## 2023-12-22 PROCEDURE — 3075F SYST BP GE 130 - 139MM HG: CPT | Performed by: STUDENT IN AN ORGANIZED HEALTH CARE EDUCATION/TRAINING PROGRAM

## 2023-12-22 PROCEDURE — 3079F DIAST BP 80-89 MM HG: CPT | Performed by: STUDENT IN AN ORGANIZED HEALTH CARE EDUCATION/TRAINING PROGRAM

## 2023-12-22 PROCEDURE — 86803 HEPATITIS C AB TEST: CPT | Performed by: STUDENT IN AN ORGANIZED HEALTH CARE EDUCATION/TRAINING PROGRAM

## 2023-12-22 RX ORDER — TADALAFIL 5 MG/1
5 TABLET ORAL DAILY
COMMUNITY

## 2023-12-27 ENCOUNTER — OFFICE VISIT (OUTPATIENT)
Dept: FAMILY MEDICINE CLINIC | Facility: CLINIC | Age: 38
End: 2023-12-27
Payer: COMMERCIAL

## 2023-12-27 VITALS
HEIGHT: 68 IN | OXYGEN SATURATION: 96 % | WEIGHT: 200.63 LBS | TEMPERATURE: 99 F | HEART RATE: 70 BPM | BODY MASS INDEX: 30.41 KG/M2 | SYSTOLIC BLOOD PRESSURE: 118 MMHG | RESPIRATION RATE: 16 BRPM | DIASTOLIC BLOOD PRESSURE: 64 MMHG

## 2023-12-27 DIAGNOSIS — J01.00 ACUTE NON-RECURRENT MAXILLARY SINUSITIS: Primary | ICD-10-CM

## 2023-12-27 PROCEDURE — 99213 OFFICE O/P EST LOW 20 MIN: CPT | Performed by: NURSE PRACTITIONER

## 2023-12-27 PROCEDURE — 3008F BODY MASS INDEX DOCD: CPT | Performed by: NURSE PRACTITIONER

## 2023-12-27 PROCEDURE — 3074F SYST BP LT 130 MM HG: CPT | Performed by: NURSE PRACTITIONER

## 2023-12-27 PROCEDURE — 3078F DIAST BP <80 MM HG: CPT | Performed by: NURSE PRACTITIONER

## 2023-12-27 RX ORDER — FLUTICASONE PROPIONATE 50 MCG
2 SPRAY, SUSPENSION (ML) NASAL DAILY
Qty: 1 EACH | Refills: 0 | Status: SHIPPED | OUTPATIENT
Start: 2023-12-27 | End: 2024-01-10

## 2023-12-27 RX ORDER — AMOXICILLIN AND CLAVULANATE POTASSIUM 875; 125 MG/1; MG/1
1 TABLET, FILM COATED ORAL 2 TIMES DAILY
Qty: 20 TABLET | Refills: 0 | Status: SHIPPED | OUTPATIENT
Start: 2023-12-27 | End: 2024-01-06

## 2023-12-27 NOTE — PATIENT INSTRUCTIONS
-Augmentin as prescribed. Take with food  -Push fluids and plenty of rest    -OTC cough medicine such as Mucinex DM or Robitussin DM (guaifenesin and dextromethorphan) as packet insert for dry and congested cough. -OTC Tylenol/Ibuprofen as packet insert If no allergies  -Soothing cough drops as packet insert   -Flonase.   Stop if any nose bleeds  -Good handwashing, to prevent spread of virus    -Face mask helps prevent viral infections    Follow up in 3-5 days for worsening symptoms with clinic or PCP

## 2024-02-14 ENCOUNTER — TELEMEDICINE (OUTPATIENT)
Dept: FAMILY MEDICINE CLINIC | Facility: CLINIC | Age: 39
End: 2024-02-14
Payer: COMMERCIAL

## 2024-02-14 DIAGNOSIS — J06.9 VIRAL URI WITH COUGH: Primary | ICD-10-CM

## 2024-02-14 PROCEDURE — 99213 OFFICE O/P EST LOW 20 MIN: CPT | Performed by: NURSE PRACTITIONER

## 2024-02-14 RX ORDER — BENZONATATE 100 MG/1
100 CAPSULE ORAL 3 TIMES DAILY PRN
Qty: 30 CAPSULE | Refills: 0 | Status: SHIPPED | OUTPATIENT
Start: 2024-02-14

## 2024-02-14 NOTE — PROGRESS NOTES
Due to the real risk of possible exposure to Coronavirus (CoV-2, COVID-19) in the office/medical building and recommendations for social distancing (key to mitigation/limiting the spread of the virus) a Virtual or Telemedicine visit over the phone was performed as below.     Patient has consented to the Virtual/Telephone Check-In service and expresses understanding and accepts financial responsibility for any deductible, co-insurance and/or co-pays associated with this service.    [facility]  Telehealth Verbal Consent   I conducted a telehealth visit with Marko Kim today, 02/14/24, which was completed using two-way, real-time interactive audio and video communication. This has been done in good hermelinda to provide continuity of care in the best interest of the provider-patient relationship, due to the COVID -19 public health crisis/national emergency where restrictions of face-to-face office visits are ongoing. Every conscious effort was taken to allow for sufficient and adequate time to complete the visit.  The patient was made aware of the limitations of the telehealth visit, including treatment limitations as no physical exam could be performed.  The patient was advised to call 911 or to go to the ER in case there was an emergency.  The patient was also advised of the potential privacy & security concerns related to the telehealth platform.   The patient was made aware of where to find [facility]'s notice of privacy practices, telehealth consent form and other related consent forms and documents.  which are located on the [facility] website. The patient verbally agreed to telehealth consent form, related consents and the risks discussed.    Lastly, the patient confirmed that they were in [place].   Included in this visit, time may have been spent reviewing labs, medications, radiology tests and decision making. Appropriate medical decision-making and tests are ordered as detailed in the plan of care  above.  Coding/billing information is submitted for this visit based on complexity of care and/or time spent for the visit.    HPI:  Chief Complaint   Patient presents with    Follow - Up     Recurrent sinusitis       Marko Kim is a 38 year old male who calls for complaints of:    Sinus infections. He has had 3 upper respiratory illnesses in the past few months. He was seen at  and treated with antibiotics for the last two, most recent infection was end of December. Symptoms have fully resolved each time. Most recent illness started 2/9 with sneezing, cough with yellow phlegm, headaches, fatigue, congestion, sinus pressure, mild sore throat. Denies chest pain, dyspnea, n/v/d. Has been using Flonase.     ROS:  See HPI.    Physical Exam:  GEN:  Patient is alert, awake and oriented, well developed, well nourished.  LUNGS: Patient speaks clearly in full sentences without dyspnea. Coughed a few times.  PSYCH: Appropriate mood and affect.    Allergies   Allergen Reactions    Seasonal OTHER (SEE COMMENTS)     Current Outpatient Medications   Medication Sig Dispense Refill    vortioxetine (TRINTELLIX) 20 MG Oral Tab Take 1 tablet (20 mg total) by mouth daily. 90 tablet 1    tadalafil 5 MG Oral Tab Take 1 tablet (5 mg total) by mouth daily.      NON FORMULARY Jeni Yepez      omega-3 fatty acids 1000 MG Oral Cap       Misc Natural Products (GLUCOSAMINE CHOND CMP ADVANCED OR)       Magnesium Bisglycinate Dihyd Does not apply Powder        Past Medical History:   Diagnosis Date    Anxiety     Borderline personality disorder in adult (HCC) 09/20/2021    Visual impairment      Past Surgical History:   Procedure Laterality Date    UPPER GI ENDOSCOPY,EXAM           ASSESSMENT AND PLAN:   1. Patient is a 38 year old male who calls for: Viral URI with cough    Additional Assessment and Plan:  1. Viral URI  -Recommended symptomatic treatment. Continue Flonase BID, add Claritin/Zyrtec daily, Mucinex PRN, Sudafed PRN,  ibuprofen/Tylenol PRN. Push PO fluids, humidifier, rest. Should stay home until feeling better.   -If worsening/persistent headaches, facial pressure, congestion after being ill 10-14 days or more, can notify me and we can prescribe Augmentin for sinusitis. Discussed that up to 6-10 respiratory illnesses per season can be considered normal.      Follow up with me 1 week PRN    Call and/or go to the ER if worsening symptoms including, but not limited to: respiratory distress, shortness of breath and  wheezing, worsening fever, cough and mental status.      The patient (or patient's parent if <17 y/o) indicates understanding of the above recommendations and agrees to the above plan.    No orders of the defined types were placed in this encounter.      Meds & Refills for this Visit:  Requested Prescriptions      No prescriptions requested or ordered in this encounter       Imaging & Consults:  None    DEMARCUS George     Time spent during encounter: 15 minutes

## 2024-05-13 ENCOUNTER — TELEMEDICINE (OUTPATIENT)
Dept: FAMILY MEDICINE CLINIC | Facility: CLINIC | Age: 39
End: 2024-05-13
Payer: COMMERCIAL

## 2024-05-13 DIAGNOSIS — Z72.51 HIGH RISK HETEROSEXUAL BEHAVIOR: ICD-10-CM

## 2024-05-13 DIAGNOSIS — R76.8 HEPATITIS A ANTIBODY POSITIVE: ICD-10-CM

## 2024-05-13 DIAGNOSIS — R79.89 ELEVATED LFTS: Primary | ICD-10-CM

## 2024-05-13 RX ORDER — AMOXICILLIN 500 MG/1
500 CAPSULE ORAL EVERY 12 HOURS
COMMUNITY
Start: 2024-01-19 | End: 2024-05-13

## 2024-05-13 RX ORDER — ALUMINUM CHLORIDE 20 %
20 SOLUTION, NON-ORAL TOPICAL
COMMUNITY
Start: 2023-12-28

## 2024-05-13 NOTE — PROGRESS NOTES
Due to the real risk of possible exposure to Coronavirus (CoV-2, COVID-19) in the office/medical building and recommendations for social distancing (key to mitigation/limiting the spread of the virus) a Virtual or Telemedicine visit over the phone was performed as below.     Patient has consented to the Virtual/Telephone Check-In service and expresses understanding and accepts financial responsibility for any deductible, co-insurance and/or co-pays associated with this service.    Telehealth outside of [facility]  Telehealth Verbal Consent   I conducted a telehealth visit with Marko Kim today, 05/13/24, which was completed using two-way, real-time interactive audio and video communication. This has been done in good hermelinda to provide continuity of care in the best interest of the provider-patient relationship, due to the COVID -19 public health crisis/national emergency where restrictions of face-to-face office visits are ongoing. Every conscious effort was taken to allow for sufficient and adequate time to complete the visit.  The patient was made aware of the limitations of the telehealth visit, including treatment limitations as no physical exam could be performed.  The patient was advised to call 911 or to go to the ER in case there was an emergency.  The patient was also advised of the potential privacy & security concerns related to the telehealth platform.   The patient was made aware of where to find [facility]'s notice of privacy practices, telehealth consent form and other related consent forms and documents.  which are located on the [facility] website. The patient verbally agreed to telehealth consent form, related consents and the risks discussed.    Lastly, the patient confirmed that they were in [place].   Included in this visit, time may have been spent reviewing labs, medications, radiology tests and decision making. Appropriate medical decision-making and tests are ordered as detailed in the plan of care  above.  Coding/billing information is submitted for this visit based on complexity of care and/or time spent for the visit.    HPI:  Chief Complaint   Patient presents with    Follow - Up     Review outside lab results       Marko Kim is a 38 year old male who calls for complaints of:    Concern about hepatitis A. He was hiking in [place] last month when he had some diarrhea. He then had some alcoholic drinks that night and the next day had severe diarrhea, vomiting, sweats, abdominal cramping. He went to an IC there and received IV fluids. Labs from that location showed hepatitis A antibody positive and elevated AST (49) and ALT (142). WBC were low at the time. No fever/chills. Symptoms resolved after 4-5 days, and he never experienced any jaundice or change in color of stools/urine.     He also reports being in a polyamorous relationship and having unprotected intercourse with multiple female partners. He would like STI testing. Denies symptoms.     ROS:  GEN: Denies fever, chills, fatigue.  HEENT: Denies nasal congestion/runny nose, sore throat, changes in taste/smell.  CARDIOVASCULAR: Denies chest pain, dyspnea.  RESPIRATORY: Denies cough, dyspnea.  GI: Denies n/v/d/c, appetite normal.  : No dysuria, hematuria, frequency.  MUSCULOSKELETAL: Denies body aches.  NEURO: Denies headaches, dizziness.    Physical Exam:  GEN:  Patient is alert, awake and oriented, well developed, well nourished.  LUNGS: Patient speaks clearly in full sentences without dyspnea, no cough while on video visit.  PSYCH: Appropriate mood and affect.    Allergies   Allergen Reactions    Seasonal OTHER (SEE COMMENTS)     Current Outpatient Medications   Medication Sig Dispense Refill    DRYSOL 20 % External Solution Apply 20 % topically.      VYVANSE 20 MG Oral Cap Take 1 capsule (20 mg total) by mouth every morning. 30 capsule 0    lisdexamfetamine (VYVANSE) 20 MG Oral Cap Take 1 capsule (20 mg total) by mouth every morning.  Generic or brand whichever is covered 30 capsule 0    vortioxetine (TRINTELLIX) 20 MG Oral Tab Take 1 tablet (20 mg total) by mouth daily. 90 tablet 1    tadalafil 5 MG Oral Tab Take 1 tablet (5 mg total) by mouth daily.      NON FORMULARY Jeni Yepez      omega-3 fatty acids 1000 MG Oral Cap       Misc Natural Products (GLUCOSAMINE CHOND CMP ADVANCED OR)       Magnesium Bisglycinate Dihyd Does not apply Powder        Past Medical History:    Anxiety    Borderline personality disorder in adult (HCC)    Visual impairment     Past Surgical History:   Procedure Laterality Date    Upper gi endoscopy,exam           ASSESSMENT AND PLAN:   1. Patient is a 38 year old male who calls for: Hepatitis A antibody positive    Additional Assessment and Plan:  1. Elevated LFTs  -Will repeat on labs now. See below.  - Comp Metabolic Panel (14); Future    2. Hepatitis A antibody positive  -Patient does not know his vaccination history so it is unclear if this was positive due to infection vs immunity. Will repeat hepatitis A testing now with IgM. Will also test for hepatitis B and C due to high-risk heterosexual behavior.  -Symptoms have resolved now.  -Recommended to avoid ETOH and hepatotoxic medications. S/S to report reviewed.   - CBC With Differential With Platelet; Future  - Hepatitis A B + C profile [E]; Future    3. High risk heterosexual behavior  -Recommended using a condom with every encounter to prevent STIs and unintended pregnancy.  -Labs as below.  -Recommended testing with each new partner or yearly, whichever is more often.   - Hepatitis A B + C profile [E]; Future  - HIV AG AB Combo [E]; Future  - Chlamydia/GC PCR Combo [E]; Future  - T Pallidum Screening [place]; Future        Follow up with me 1 month PRN    Call and/or go to the ER if worsening symptoms including, but not limited to: respiratory distress, shortness of breath and  wheezing, worsening fever, cough and mental status.      The patient (or patient's  parent if <19 y/o) indicates understanding of the above recommendations and agrees to the above plan.    Orders Placed This Encounter   Procedures    Comp Metabolic Panel (14)    CBC With Differential With Platelet    Hepatitis A B + C profile [E]    HIV AG AB Combo [E]    T Pallidum Screening [place]    Chlamydia/GC PCR Combo [E]       Meds & Refills for this Visit:  Requested Prescriptions      No prescriptions requested or ordered in this encounter       Imaging & Consults:  None    DEMARUCS George     Time spent during encounter: 15 minutes

## 2024-05-17 ENCOUNTER — LAB ENCOUNTER (OUTPATIENT)
Dept: LAB | Age: 39
End: 2024-05-17
Attending: NURSE PRACTITIONER

## 2024-05-17 DIAGNOSIS — R79.89 ELEVATED LFTS: ICD-10-CM

## 2024-05-17 DIAGNOSIS — R76.8 HEPATITIS A ANTIBODY POSITIVE: ICD-10-CM

## 2024-05-17 DIAGNOSIS — Z72.51 HIGH RISK HETEROSEXUAL BEHAVIOR: ICD-10-CM

## 2024-05-17 LAB
ALBUMIN SERPL-MCNC: 4.9 G/DL (ref 3.2–4.8)
ALBUMIN/GLOB SERPL: 1.7 {RATIO} (ref 1–2)
ALP LIVER SERPL-CCNC: 63 U/L
ALT SERPL-CCNC: 20 U/L
ANION GAP SERPL CALC-SCNC: 4 MMOL/L (ref 0–18)
AST SERPL-CCNC: 26 U/L (ref ?–34)
BASOPHILS # BLD AUTO: 0.04 X10(3) UL (ref 0–0.2)
BASOPHILS NFR BLD AUTO: 1.1 %
BILIRUB SERPL-MCNC: 0.5 MG/DL (ref 0.3–1.2)
BUN BLD-MCNC: 22 MG/DL (ref 9–23)
BUN/CREAT SERPL: 22.2 (ref 10–20)
CALCIUM BLD-MCNC: 9.8 MG/DL (ref 8.7–10.4)
CHLORIDE SERPL-SCNC: 106 MMOL/L (ref 98–112)
CO2 SERPL-SCNC: 30 MMOL/L (ref 21–32)
CREAT BLD-MCNC: 0.99 MG/DL
DEPRECATED RDW RBC AUTO: 45.6 FL (ref 35.1–46.3)
EGFRCR SERPLBLD CKD-EPI 2021: 101 ML/MIN/1.73M2 (ref 60–?)
EOSINOPHIL # BLD AUTO: 0.07 X10(3) UL (ref 0–0.7)
EOSINOPHIL NFR BLD AUTO: 1.9 %
ERYTHROCYTE [DISTWIDTH] IN BLOOD BY AUTOMATED COUNT: 13.4 % (ref 11–15)
FASTING STATUS PATIENT QL REPORTED: YES
GLOBULIN PLAS-MCNC: 2.9 G/DL (ref 2–3.5)
GLUCOSE BLD-MCNC: 80 MG/DL (ref 70–99)
HAV AB SER QL IA: REACTIVE
HAV IGM SER QL: NONREACTIVE
HBV CORE AB SERPL QL IA: NONREACTIVE
HBV SURFACE AB SER QL: REACTIVE
HBV SURFACE AB SERPL IA-ACNC: >1000 MIU/ML
HBV SURFACE AG SERPL QL IA: NONREACTIVE
HCT VFR BLD AUTO: 43.6 %
HCV AB SERPL QL IA: NONREACTIVE
HGB BLD-MCNC: 14.9 G/DL
IMM GRANULOCYTES # BLD AUTO: 0.01 X10(3) UL (ref 0–1)
IMM GRANULOCYTES NFR BLD: 0.3 %
LYMPHOCYTES # BLD AUTO: 1.18 X10(3) UL (ref 1–4)
LYMPHOCYTES NFR BLD AUTO: 32.7 %
MCH RBC QN AUTO: 31.9 PG (ref 26–34)
MCHC RBC AUTO-ENTMCNC: 34.2 G/DL (ref 31–37)
MCV RBC AUTO: 93.4 FL
MONOCYTES # BLD AUTO: 0.38 X10(3) UL (ref 0.1–1)
MONOCYTES NFR BLD AUTO: 10.5 %
NEUTROPHILS # BLD AUTO: 1.93 X10 (3) UL (ref 1.5–7.7)
NEUTROPHILS # BLD AUTO: 1.93 X10(3) UL (ref 1.5–7.7)
NEUTROPHILS NFR BLD AUTO: 53.5 %
OSMOLALITY SERPL CALC.SUM OF ELEC: 292 MOSM/KG (ref 275–295)
PLATELET # BLD AUTO: 193 10(3)UL (ref 150–450)
POTASSIUM SERPL-SCNC: 4.3 MMOL/L (ref 3.5–5.1)
PROT SERPL-MCNC: 7.8 G/DL (ref 5.7–8.2)
RBC # BLD AUTO: 4.67 X10(6)UL
SODIUM SERPL-SCNC: 140 MMOL/L (ref 136–145)
T PALLIDUM AB SER QL IA: NONREACTIVE
WBC # BLD AUTO: 3.6 X10(3) UL (ref 4–11)

## 2024-05-17 PROCEDURE — 86708 HEPATITIS A ANTIBODY: CPT | Performed by: NURSE PRACTITIONER

## 2024-05-17 PROCEDURE — 85025 COMPLETE CBC W/AUTO DIFF WBC: CPT | Performed by: NURSE PRACTITIONER

## 2024-05-17 PROCEDURE — 80053 COMPREHEN METABOLIC PANEL: CPT | Performed by: NURSE PRACTITIONER

## 2024-05-17 PROCEDURE — 87491 CHLMYD TRACH DNA AMP PROBE: CPT | Performed by: NURSE PRACTITIONER

## 2024-05-17 PROCEDURE — 87591 N.GONORRHOEAE DNA AMP PROB: CPT | Performed by: NURSE PRACTITIONER

## 2024-05-17 PROCEDURE — 87340 HEPATITIS B SURFACE AG IA: CPT | Performed by: NURSE PRACTITIONER

## 2024-05-17 PROCEDURE — 86706 HEP B SURFACE ANTIBODY: CPT | Performed by: NURSE PRACTITIONER

## 2024-05-17 PROCEDURE — 80503 PATH CLIN CONSLTJ SF 5-20: CPT | Performed by: NURSE PRACTITIONER

## 2024-05-17 PROCEDURE — 86704 HEP B CORE ANTIBODY TOTAL: CPT | Performed by: NURSE PRACTITIONER

## 2024-05-17 PROCEDURE — 86803 HEPATITIS C AB TEST: CPT | Performed by: NURSE PRACTITIONER

## 2024-05-17 PROCEDURE — 87389 HIV-1 AG W/HIV-1&-2 AB AG IA: CPT | Performed by: NURSE PRACTITIONER

## 2024-05-17 PROCEDURE — 86709 HEPATITIS A IGM ANTIBODY: CPT | Performed by: NURSE PRACTITIONER

## 2024-05-17 PROCEDURE — 86780 TREPONEMA PALLIDUM: CPT | Performed by: NURSE PRACTITIONER

## 2024-05-20 LAB
C TRACH DNA SPEC QL NAA+PROBE: NEGATIVE
N GONORRHOEA DNA SPEC QL NAA+PROBE: NEGATIVE

## 2024-07-02 ENCOUNTER — TELEMEDICINE (OUTPATIENT)
Dept: FAMILY MEDICINE CLINIC | Facility: CLINIC | Age: 39
End: 2024-07-02
Payer: COMMERCIAL

## 2024-07-02 ENCOUNTER — PATIENT MESSAGE (OUTPATIENT)
Dept: FAMILY MEDICINE CLINIC | Facility: CLINIC | Age: 39
End: 2024-07-02

## 2024-07-02 DIAGNOSIS — Z20.822 EXPOSURE TO COVID-19 VIRUS: Primary | ICD-10-CM

## 2024-07-02 DIAGNOSIS — U07.1 COVID: Primary | ICD-10-CM

## 2024-07-02 DIAGNOSIS — R09.81 NASAL CONGESTION: ICD-10-CM

## 2024-07-02 DIAGNOSIS — J02.9 SORE THROAT: ICD-10-CM

## 2024-07-02 PROCEDURE — 99213 OFFICE O/P EST LOW 20 MIN: CPT | Performed by: NURSE PRACTITIONER

## 2024-07-02 NOTE — TELEPHONE ENCOUNTER
From: Marko Kim  To: Trinh Cabezas  Sent: 7/2/2024 9:19 AM CDT  Subject: Covid Test Positive - Request Paxlovid    My covid test came back positive, can you please prescribe paxlovid?

## 2024-07-02 NOTE — PROGRESS NOTES
Due to the real risk of possible exposure to Coronavirus (CoV-2, COVID-19) in the office/medical building and recommendations for social distancing (key to mitigation/limiting the spread of the virus) a Virtual or Telemedicine visit over the phone was performed as below.     Patient has consented to the Virtual/Telephone Check-In service and expresses understanding and accepts financial responsibility for any deductible, co-insurance and/or co-pays associated with this service.    [facility] of [facility]  Telehealth Verbal Consent   I conducted a telehealth visit with Marko Kim today, 07/02/24, which was completed using two-way, real-time interactive audio and video communication. This has been done in good hermelinda to provide continuity of care in the best interest of the provider-patient relationship, due to the COVID -19 public health crisis/national emergency where restrictions of face-to-face office visits are ongoing. Every conscious effort was taken to allow for sufficient and adequate time to complete the visit.  The patient was made aware of the limitations of the telehealth visit, including treatment limitations as no physical exam could be performed.  The patient was advised to call 911 or to go to the ER in case there was an emergency.  The patient was also advised of the potential privacy & security concerns related to the telehealth platform.   The patient was made aware of where to find [facility]'s notice of privacy practices, telehealth consent form and other related consent forms and documents.  which are located on the [facility] website. The patient verbally agreed to telehealth consent form, related consents and the risks discussed.    Lastly, the patient confirmed that they were in [place].   Included in this visit, time may have been spent reviewing labs, medications, radiology tests and decision making. Appropriate medical decision-making and tests are ordered as detailed in the plan of care  above.  Coding/billing information is submitted for this visit based on complexity of care and/or time spent for the visit.    HPI:  Chief Complaint   Patient presents with    Follow - Up     COVID       Markotameka Kim is a 38 year old male who calls for complaints of:    Thinks he may have COVID. Symptoms started yesterday afternoon. Reports sore throat, suspected fever, congestion, fatigue, body aches. Denies headaches, cough, chest pain, dyspnea, nausea, vomiting, diarrhea, change in taste or smell.     ROS:  See HPI.    Physical Exam:  GEN:  Patient is alert, awake and oriented, well developed, well nourished.  LUNGS: Patient speaks clearly in full sentences without dyspnea, no cough while on video visit.  PSYCH: Appropriate mood and affect.    Allergies   Allergen Reactions    Seasonal OTHER (SEE COMMENTS)     Current Outpatient Medications   Medication Sig Dispense Refill    DRYSOL 20 % External Solution Apply 20 % topically.      VYVANSE 20 MG Oral Cap Take 1 capsule (20 mg total) by mouth every morning. 30 capsule 0    lisdexamfetamine (VYVANSE) 20 MG Oral Cap Take 1 capsule (20 mg total) by mouth every morning. Generic or brand whichever is covered 30 capsule 0    vortioxetine (TRINTELLIX) 20 MG Oral Tab Take 1 tablet (20 mg total) by mouth daily. 90 tablet 1    tadalafil 5 MG Oral Tab Take 1 tablet (5 mg total) by mouth daily.      NON FORMULARY Jeni Yepez      omega-3 fatty acids 1000 MG Oral Cap       Misc Natural Products (GLUCOSAMINE CHOND CMP ADVANCED OR)       Magnesium Bisglycinate Dihyd Does not apply Powder        Past Medical History:    Anxiety    Borderline personality disorder in adult (HCC)    Visual impairment     Past Surgical History:   Procedure Laterality Date    Upper gi endoscopy,exam           ASSESSMENT AND PLAN:   1. Patient is a 38 year old male who calls for: COVID exposure    Additional Assessment and Plan:  Exposure to COVID  -Recommended a home COVID test. If positive,  he would like Paxlovid.   -Discussed the need to quarantine self and household members until results are known.  -Recommended patient isolate from family members and all parties should wear a mask when patient is outside of room.   -Good hand hygiene for all house members, do not share food/drinks. Recommended disinfecting shared surfaces.  -Recommended patient notify recent contacts that they are being tested for COVID, contacts should isolate as well.  -Signs/symptoms to report discussed. To go to ER with any chest pain, severe/persistent shortness of breath, or inability to tolerate PO fluids.   -Recommended symptomatic treatment including PO fluids, humidifier in bedroom, rest, OTC cough/cold medications that match symptoms.  -Discussed how to take Paxlovid and possible side effects. Cannot take tadalafil with this. He will notify me of his home COVID test results. If negative today, can repeat test tomorrow.     2. Sore throat  -See above.    3. Nasal congestion  -See above.    Follow up with me 2-3 days PRN    Call and/or go to the ER if worsening symptoms including, but not limited to: respiratory distress, shortness of breath and  wheezing, worsening fever, cough and mental status.      The patient (or patient's parent if <17 y/o) indicates understanding of the above recommendations and agrees to the above plan.    No orders of the defined types were placed in this encounter.      Meds & Refills for this Visit:  Requested Prescriptions      No prescriptions requested or ordered in this encounter       Imaging & Consults:  None    DEMARCUS George     Time spent during encounter: 15 minutes

## 2024-07-12 ENCOUNTER — LAB ENCOUNTER (OUTPATIENT)
Dept: LAB | Facility: HOSPITAL | Age: 39
End: 2024-07-12
Attending: NURSE PRACTITIONER
Payer: COMMERCIAL

## 2024-07-12 DIAGNOSIS — Z72.51 HIGH RISK HETEROSEXUAL BEHAVIOR: ICD-10-CM

## 2024-07-12 LAB
HAV AB SER QL IA: REACTIVE
HAV IGM SER QL: NONREACTIVE
HBV CORE AB SERPL QL IA: NONREACTIVE
HBV SURFACE AB SER QL: REACTIVE
HBV SURFACE AB SERPL IA-ACNC: >1000 MIU/ML
HBV SURFACE AG SERPL QL IA: NONREACTIVE
HCV AB SERPL QL IA: NONREACTIVE
T PALLIDUM AB SER QL IA: NONREACTIVE

## 2024-07-12 PROCEDURE — 87340 HEPATITIS B SURFACE AG IA: CPT | Performed by: NURSE PRACTITIONER

## 2024-07-12 PROCEDURE — 87491 CHLMYD TRACH DNA AMP PROBE: CPT | Performed by: NURSE PRACTITIONER

## 2024-07-12 PROCEDURE — 86709 HEPATITIS A IGM ANTIBODY: CPT | Performed by: NURSE PRACTITIONER

## 2024-07-12 PROCEDURE — 86780 TREPONEMA PALLIDUM: CPT | Performed by: NURSE PRACTITIONER

## 2024-07-12 PROCEDURE — 86803 HEPATITIS C AB TEST: CPT | Performed by: NURSE PRACTITIONER

## 2024-07-12 PROCEDURE — 87389 HIV-1 AG W/HIV-1&-2 AB AG IA: CPT | Performed by: NURSE PRACTITIONER

## 2024-07-12 PROCEDURE — 87591 N.GONORRHOEAE DNA AMP PROB: CPT | Performed by: NURSE PRACTITIONER

## 2024-07-12 PROCEDURE — 86708 HEPATITIS A ANTIBODY: CPT | Performed by: NURSE PRACTITIONER

## 2024-07-12 PROCEDURE — 86704 HEP B CORE ANTIBODY TOTAL: CPT | Performed by: NURSE PRACTITIONER

## 2024-07-12 PROCEDURE — 80503 PATH CLIN CONSLTJ SF 5-20: CPT | Performed by: NURSE PRACTITIONER

## 2024-07-12 PROCEDURE — 86706 HEP B SURFACE ANTIBODY: CPT | Performed by: NURSE PRACTITIONER

## 2024-07-15 LAB
C TRACH DNA SPEC QL NAA+PROBE: NEGATIVE
N GONORRHOEA DNA SPEC QL NAA+PROBE: NEGATIVE

## 2024-08-22 ENCOUNTER — TELEMEDICINE (OUTPATIENT)
Dept: FAMILY MEDICINE CLINIC | Facility: CLINIC | Age: 39
End: 2024-08-22
Payer: COMMERCIAL

## 2024-08-22 DIAGNOSIS — D72.819 LEUKOPENIA, UNSPECIFIED TYPE: Primary | ICD-10-CM

## 2024-08-22 DIAGNOSIS — N52.9 ERECTILE DYSFUNCTION, UNSPECIFIED ERECTILE DYSFUNCTION TYPE: ICD-10-CM

## 2024-08-22 DIAGNOSIS — K64.9 HEMORRHOIDS, UNSPECIFIED HEMORRHOID TYPE: ICD-10-CM

## 2024-08-22 DIAGNOSIS — F33.1 MODERATE EPISODE OF RECURRENT MAJOR DEPRESSIVE DISORDER (HCC): ICD-10-CM

## 2024-08-22 DIAGNOSIS — Z00.00 ADULT GENERAL MEDICAL EXAMINATION: ICD-10-CM

## 2024-08-22 DIAGNOSIS — A63.0 GENITAL WARTS: ICD-10-CM

## 2024-08-22 PROBLEM — M25.511 SHOULDER PAIN, BILATERAL: Status: RESOLVED | Noted: 2021-11-22 | Resolved: 2024-08-22

## 2024-08-22 PROBLEM — M25.551 RIGHT HIP PAIN: Status: RESOLVED | Noted: 2017-08-21 | Resolved: 2024-08-22

## 2024-08-22 PROBLEM — F32.A DEPRESSION: Status: RESOLVED | Noted: 2021-09-20 | Resolved: 2024-08-22

## 2024-08-22 PROBLEM — M25.512 SHOULDER PAIN, BILATERAL: Status: RESOLVED | Noted: 2021-11-22 | Resolved: 2024-08-22

## 2024-08-22 PROBLEM — F41.9 ANXIETY: Status: RESOLVED | Noted: 2017-08-21 | Resolved: 2024-08-22

## 2024-08-22 PROBLEM — F33.2 SEVERE EPISODE OF RECURRENT MAJOR DEPRESSIVE DISORDER, WITHOUT PSYCHOTIC FEATURES (HCC): Status: RESOLVED | Noted: 2019-10-14 | Resolved: 2024-08-22

## 2024-08-22 PROBLEM — K29.70 GASTRITIS: Status: RESOLVED | Noted: 2019-10-14 | Resolved: 2024-08-22

## 2024-08-22 PROBLEM — M53.3 COCCYX PAIN: Status: RESOLVED | Noted: 2019-10-14 | Resolved: 2024-08-22

## 2024-08-22 PROCEDURE — 99214 OFFICE O/P EST MOD 30 MIN: CPT | Performed by: NURSE PRACTITIONER

## 2024-08-22 RX ORDER — HYDROCORTISONE ACETATE 25 MG/1
25 SUPPOSITORY RECTAL 2 TIMES DAILY
Qty: 28 SUPPOSITORY | Refills: 0 | Status: SHIPPED | OUTPATIENT
Start: 2024-08-22 | End: 2024-09-05

## 2024-08-22 NOTE — PROGRESS NOTES
Due to the real risk of possible exposure to Coronavirus (CoV-2, COVID-19) in the office/medical building and recommendations for social distancing (key to mitigation/limiting the spread of the virus) a Virtual or Telemedicine visit over the phone was performed as below.     Patient has consented to the Virtual/Telephone Check-In service and expresses understanding and accepts financial responsibility for any deductible, co-insurance and/or co-pays associated with this service.    Telehealth outside of [facility]  Telehealth Verbal Consent   I conducted a telehealth visit with Marko Kim today, 08/22/24, which was completed using two-way, real-time interactive audio and video communication. This has been done in good hermelinda to provide continuity of care in the best interest of the provider-patient relationship, due to the COVID -19 public health crisis/national emergency where restrictions of face-to-face office visits are ongoing. Every conscious effort was taken to allow for sufficient and adequate time to complete the visit.  The patient was made aware of the limitations of the telehealth visit, including treatment limitations as no physical exam could be performed.  The patient was advised to call 911 or to go to the ER in case there was an emergency.  The patient was also advised of the potential privacy & security concerns related to the telehealth platform.   The patient was made aware of where to find [facility]'s notice of privacy practices, telehealth consent form and other related consent forms and documents.  which are located on the [facility] website. The patient verbally agreed to telehealth consent form, related consents and the risks discussed.    Lastly, the patient confirmed that they were in [place].   Included in this visit, time may have been spent reviewing labs, medications, radiology tests and decision making. Appropriate medical decision-making and tests are ordered as detailed in the plan of care  above.  Coding/billing information is submitted for this visit based on complexity of care and/or time spent for the visit.    HPI:  Chief Complaint   Patient presents with    Follow - Up     Low WBC, HPV, hemorrhoids       Marko Kim is a 38 year old male who calls for complaints of:    Several concerns. Reports he would like to know what he can do to increase his WBC, which has been stable but decreased x years. Last WBC 3.6 in May 2024, prior to that was 3.5 9/2023. Has not seen hematology. Feels like he gets sick often. Denies weight loss, fevers, chills, night sweats, swollen lymph nodes.     Recently diagnosed with genital warts by dermatology. Requesting HPV vaccination. Had some warts removed but others to be treated with a medication pending prior authorization.     Hx hemorrhoids x 15 years, external per patient. These sometimes feel itchy or painful. He sometimes has an oily discharge from them. Occasionally they bleed. Denies constipation. Denies participation in anal sex. Would like these \"taken care of.\"     Requests testosterone to be rechecked. Due for yearly labs September 2024. Hx ED. Hx depression/anxiety, followed by psychiatry.     ROS:  Negative except as per HPI.    Physical Exam:  GEN:  Patient is alert, awake and oriented, well developed, well nourished.  LUNGS: Patient speaks clearly in full sentences without dyspnea, no cough while on video visit.  PSYCH: Appropriate mood and affect.    Allergies   Allergen Reactions    Seasonal OTHER (SEE COMMENTS)     Current Outpatient Medications   Medication Sig Dispense Refill    hydrocortisone (ANUSOL-HC) 25 MG Rectal Suppos Place 1 suppository (25 mg total) rectally 2 (two) times daily for 14 days. 28 suppository 0    lurasidone (LATUDA) 20 MG Oral Tab Take 1 tablet (20 mg total) by mouth daily with dinner. 30 tablet 0    lisdexamfetamine (VYVANSE) 20 MG Oral Cap Take 1 capsule (20 mg total) by mouth every morning. Generic or brand  whichever is covered 30 capsule 0    gabapentin 100 MG Oral Cap Take 1-3 capsules (100-300 mg total) by mouth 3 (three) times daily as needed (anxiety). 30 capsule 1    QUEtiapine (SEROQUEL) 25 MG Oral Tab Take 1 tablet (25 mg total) by mouth nightly. 30 tablet 1    DRYSOL 20 % External Solution Apply 20 % topically.      VYVANSE 20 MG Oral Cap Take 1 capsule (20 mg total) by mouth every morning. 30 capsule 0    vortioxetine (TRINTELLIX) 20 MG Oral Tab Take 1 tablet (20 mg total) by mouth daily. 90 tablet 1    tadalafil 5 MG Oral Tab Take 1 tablet (5 mg total) by mouth daily.      NON FORMULARY Jeni Lucho      omega-3 fatty acids 1000 MG Oral Cap       Misc Natural Products (GLUCOSAMINE CHOND CMP ADVANCED OR)       Magnesium Bisglycinate Dihyd Does not apply Powder        Past Medical History:    Anxiety    Borderline personality disorder in adult (HCC)    Coccyx pain    Last Assessment & Plan:   Formatting of this note might be different from the original.  Active   Advised OTC ibuprofen   Continue use oc coccyx pillow   Hx of negative XR per patient   F/u if sx persist or worsen      Right hip pain    Shoulder pain, bilateral    Visual impairment     Past Surgical History:   Procedure Laterality Date    Upper gi endoscopy,exam           ASSESSMENT AND PLAN:   1. Patient is a 38 year old male who calls for: Several concerns    Additional Assessment and Plan:  1. Leukopenia, unspecified type  -Will repeat WBC now. Can consider hematology referral pending results.   - CBC With Differential With Platelet; Future    2. Genital warts  -Followed by dermatology.  -Can start HPV series when patient comes in for physical.     3. Hemorrhoids, unspecified hemorrhoid type  -Recommended a trial of Anusol-HC suppositories BID x 2 weeks. Discussed how to use these and possible side effects.   -Discussed importance of preventing constipation through fiber, exercise, fluids. Limit prolonged sitting.  -Referred to surgery for  consult.  - hydrocortisone (ANUSOL-HC) 25 MG Rectal Suppos; Place 1 suppository (25 mg total) rectally 2 (two) times daily for 14 days.  Dispense: 28 suppository; Refill: 0  - Surgery Referral - In Network    4. Moderate episode of recurrent major depressive disorder (HCC)  -Followed by psychiatry.    5. Erectile dysfunction, unspecified erectile dysfunction type  -Will check testosterone per request.  - Testosterone,Total and Weakly Bound w/ SHBG; Future    6. Adult general medical examination  - CBC With Differential With Platelet; Future  - Comp Metabolic Panel (14); Future  - Hemoglobin A1C; Future  - Lipid Panel; Future  - Assay, Thyroid Stim Hormone; Future        Follow up with me 1-2 months for physical    Call and/or go to the ER if worsening symptoms including, but not limited to: respiratory distress, shortness of breath and  wheezing, worsening fever, cough and mental status.      The patient (or patient's parent if <17 y/o) indicates understanding of the above recommendations and agrees to the above plan.    Orders Placed This Encounter   Procedures    CBC With Differential With Platelet    Comp Metabolic Panel (14)    Hemoglobin A1C    Lipid Panel    Assay, Thyroid Stim Hormone    Testosterone,Total and Weakly Bound w/ SHBG       Meds & Refills for this Visit:  Requested Prescriptions     Signed Prescriptions Disp Refills    hydrocortisone (ANUSOL-HC) 25 MG Rectal Suppos 28 suppository 0     Sig: Place 1 suppository (25 mg total) rectally 2 (two) times daily for 14 days.       Imaging & Consults:  [facility] PCP OR REGISTRY REMOVAL REQUEST  SURGERY - INTERNAL    DEMARCUS George     Time spent during encounter: 20 minutes

## 2024-08-28 PROBLEM — J45.991 COUGH VARIANT ASTHMA (HCC): Status: RESOLVED | Noted: 2023-09-20 | Resolved: 2024-08-28

## 2024-08-29 ENCOUNTER — PATIENT OUTREACH (OUTPATIENT)
Dept: CASE MANAGEMENT | Age: 39
End: 2024-08-29

## 2024-08-29 NOTE — PROCEDURES
The office order to remove patient from the asthma registry is Approved and finalized on August 29, 2024.

## 2024-09-09 ENCOUNTER — OFFICE VISIT (OUTPATIENT)
Dept: SURGERY | Facility: CLINIC | Age: 39
End: 2024-09-09

## 2024-09-09 VITALS — BODY MASS INDEX: 30.31 KG/M2 | HEIGHT: 68 IN | WEIGHT: 200 LBS

## 2024-09-09 DIAGNOSIS — K62.89 PROCTITIS: ICD-10-CM

## 2024-09-09 DIAGNOSIS — K64.8 HEMORRHOIDS, COMPLICATED: Primary | ICD-10-CM

## 2024-09-09 DIAGNOSIS — K58.0 IRRITABLE BOWEL SYNDROME WITH DIARRHEA: ICD-10-CM

## 2024-09-09 PROCEDURE — 3008F BODY MASS INDEX DOCD: CPT | Performed by: SURGERY

## 2024-09-09 PROCEDURE — 99244 OFF/OP CNSLTJ NEW/EST MOD 40: CPT | Performed by: SURGERY

## 2024-09-09 NOTE — H&P
Chief complaint: Hemorrhoids    HPI: Marko presents for chronic complicated hemorrhoids for years which have failed conservative management. He has IBS, constipation and diarrhea. He has had bleeding with Bms. He has tried fiber, it causes diarrhea. He has seen GI, they apparently did not offer further Rx but told him he'd be living with these symptoms. He inquired about anal sex and at what point he could have it after a banding or hemorrhoidectomy.     Past medical history:   Past Medical History:    Anxiety    Borderline personality disorder in adult (HCC)    Coccyx pain    Last Assessment & Plan:   Formatting of this note might be different from the original.  Active   Advised OTC ibuprofen   Continue use oc coccyx pillow   Hx of negative XR per patient   F/u if sx persist or worsen      Right hip pain    Shoulder pain, bilateral    Visual impairment       Past surgical history:   Past Surgical History:   Procedure Laterality Date    Upper gi endoscopy,exam         Allergies:   Allergies   Allergen Reactions    Seasonal OTHER (SEE COMMENTS)       Medications:   Current Outpatient Medications   Medication Sig Dispense Refill    lurasidone (LATUDA) 20 MG Oral Tab Take 1 tablet (20 mg total) by mouth daily with dinner. 30 tablet 0    lisdexamfetamine (VYVANSE) 20 MG Oral Cap Take 1 capsule (20 mg total) by mouth every morning. Generic or brand whichever is covered 30 capsule 0    gabapentin 100 MG Oral Cap Take 1-3 capsules (100-300 mg total) by mouth 3 (three) times daily as needed (anxiety). 30 capsule 1    QUEtiapine (SEROQUEL) 25 MG Oral Tab Take 1 tablet (25 mg total) by mouth nightly. 30 tablet 1    DRYSOL 20 % External Solution Apply 20 % topically.      VYVANSE 20 MG Oral Cap Take 1 capsule (20 mg total) by mouth every morning. 30 capsule 0    vortioxetine (TRINTELLIX) 20 MG Oral Tab Take 1 tablet (20 mg total) by mouth daily. 90 tablet 1    tadalafil 5 MG Oral Tab Take 1 tablet (5 mg total) by mouth daily.       NON FORMULARY Jeni Yepez      omega-3 fatty acids 1000 MG Oral Cap       Misc Natural Products (GLUCOSAMINE CHOND CMP ADVANCED OR)       Magnesium Bisglycinate Dihyd Does not apply Powder          Social history:   Social History     Socioeconomic History    Marital status: Single   Tobacco Use    Smoking status: Never    Smokeless tobacco: Never   Vaping Use    Vaping status: Never Used   Substance and Sexual Activity    Alcohol use: Not Currently     Comment: QUIT 7/2023    Drug use: Not Currently     Types: Cannabis     Comment: QUIT 2 WEEKS AGO        Family history:  Family History   Problem Relation Age of Onset    Colon Cancer Paternal Grandfather         Review of Systems:   GENERAL: feels generally well  SKIN: no ulcerated or worrisome skin lesions  EYES:denies blurred vision or double vision  HEENT: denies new nasal congestion, sinus pain or ST  LUNGS: denies shortness of breath with exertion  CARDIOVASCULAR: denies chest pain on exertion  GI: no hematemesis, no BRBPR, no worsening heartburn  : no dysuria, no blood in urine, no difficulty urinating  MUSCULOSKELETAL: no new musculoskeletal complaints  NEURO: no persistent, recurrent  headaches  PSYCHE:no depression or anxiety  HEMATOLOGIC: no hx of blood dyscrasia  ENDOCRINE: no new endocrine problems  ALL/ASTHMA: no new hx of severe allergy or asthma  BACK: normal, no spinal deformity, no CVA tenderness    Physical examination:  Alert, NAD  HEENT wnl, anicteric, PERRL  Neck supple, norm ROM, no JVD  L Non-labored resp  H Reg rate  Abd soft, NT, ND, no masses, no hernias, no HSM.  Rectal: normal tone, heme negative stool, small uncompl external hemorrhoids, Internal hemorrhoids. On anoascopy: irritated red firable mucosa and an area within the rectum of white exudate plaque, no fistula seen on anoscopy  Extr no c/c/e  Skin intact, no jaundice, no rashes, no lesions  Neuro grossly intact, no focal deficits, no tremors  Back no deformity, no CVA tnd.   iate for age          Assessment and plan:  Diagnoses and all orders for this visit:    Hemorrhoids, complicated    Irritable bowel syndrome with diarrhea    Proctitis       See GI for better management of IBS symptoms and possible colonoscopy.   Internal hermorrhoid banding could be considered.   RTC after carlos CONTRERAS.    Rebecca Bettencourt MD  9/9/2024  10:12 AM

## 2024-09-19 ENCOUNTER — LAB ENCOUNTER (OUTPATIENT)
Dept: LAB | Facility: HOSPITAL | Age: 39
End: 2024-09-19
Attending: NURSE PRACTITIONER
Payer: COMMERCIAL

## 2024-09-19 DIAGNOSIS — Z72.51 HIGH RISK HETEROSEXUAL BEHAVIOR: ICD-10-CM

## 2024-09-19 DIAGNOSIS — D72.819 LEUKOPENIA, UNSPECIFIED TYPE: ICD-10-CM

## 2024-09-19 DIAGNOSIS — Z11.3 SCREENING FOR STD (SEXUALLY TRANSMITTED DISEASE): ICD-10-CM

## 2024-09-19 DIAGNOSIS — N52.9 ERECTILE DYSFUNCTION, UNSPECIFIED ERECTILE DYSFUNCTION TYPE: ICD-10-CM

## 2024-09-19 DIAGNOSIS — Z00.00 ADULT GENERAL MEDICAL EXAMINATION: ICD-10-CM

## 2024-09-19 LAB
ALBUMIN SERPL-MCNC: 4.4 G/DL (ref 3.2–4.8)
ALBUMIN/GLOB SERPL: 1.8 {RATIO} (ref 1–2)
ALP LIVER SERPL-CCNC: 54 U/L
ALT SERPL-CCNC: 21 U/L
ANION GAP SERPL CALC-SCNC: 2 MMOL/L (ref 0–18)
AST SERPL-CCNC: 21 U/L (ref ?–34)
BASOPHILS # BLD AUTO: 0.05 X10(3) UL (ref 0–0.2)
BASOPHILS NFR BLD AUTO: 1 %
BILIRUB SERPL-MCNC: 0.5 MG/DL (ref 0.3–1.2)
BUN BLD-MCNC: 21 MG/DL (ref 9–23)
BUN/CREAT SERPL: 20.4 (ref 10–20)
C TRACH DNA SPEC QL NAA+PROBE: NEGATIVE
CALCIUM BLD-MCNC: 9.4 MG/DL (ref 8.7–10.4)
CHLORIDE SERPL-SCNC: 109 MMOL/L (ref 98–112)
CHOLEST SERPL-MCNC: 153 MG/DL (ref ?–200)
CO2 SERPL-SCNC: 31 MMOL/L (ref 21–32)
CREAT BLD-MCNC: 1.03 MG/DL
DEPRECATED RDW RBC AUTO: 46.1 FL (ref 35.1–46.3)
EGFRCR SERPLBLD CKD-EPI 2021: 96 ML/MIN/1.73M2 (ref 60–?)
EOSINOPHIL # BLD AUTO: 0.15 X10(3) UL (ref 0–0.7)
EOSINOPHIL NFR BLD AUTO: 3 %
ERYTHROCYTE [DISTWIDTH] IN BLOOD BY AUTOMATED COUNT: 13.1 % (ref 11–15)
EST. AVERAGE GLUCOSE BLD GHB EST-MCNC: 105 MG/DL (ref 68–126)
FASTING PATIENT LIPID ANSWER: YES
FASTING STATUS PATIENT QL REPORTED: YES
GLOBULIN PLAS-MCNC: 2.5 G/DL (ref 2–3.5)
GLUCOSE BLD-MCNC: 86 MG/DL (ref 70–99)
HAV AB SER QL IA: REACTIVE
HAV IGM SER QL: NONREACTIVE
HBA1C MFR BLD: 5.3 % (ref ?–5.7)
HBV CORE AB SERPL QL IA: NONREACTIVE
HBV SURFACE AB SER QL: REACTIVE
HBV SURFACE AB SERPL IA-ACNC: >1000 MIU/ML
HBV SURFACE AG SERPL QL IA: NONREACTIVE
HCT VFR BLD AUTO: 44.8 %
HCV AB SERPL QL IA: NONREACTIVE
HDLC SERPL-MCNC: 59 MG/DL (ref 40–59)
HGB BLD-MCNC: 14.7 G/DL
IMM GRANULOCYTES # BLD AUTO: 0.03 X10(3) UL (ref 0–1)
IMM GRANULOCYTES NFR BLD: 0.6 %
LDLC SERPL CALC-MCNC: 80 MG/DL (ref ?–100)
LYMPHOCYTES # BLD AUTO: 1.91 X10(3) UL (ref 1–4)
LYMPHOCYTES NFR BLD AUTO: 38 %
MCH RBC QN AUTO: 30.9 PG (ref 26–34)
MCHC RBC AUTO-ENTMCNC: 32.8 G/DL (ref 31–37)
MCV RBC AUTO: 94.3 FL
MONOCYTES # BLD AUTO: 0.44 X10(3) UL (ref 0.1–1)
MONOCYTES NFR BLD AUTO: 8.8 %
N GONORRHOEA DNA SPEC QL NAA+PROBE: NEGATIVE
NEUTROPHILS # BLD AUTO: 2.44 X10 (3) UL (ref 1.5–7.7)
NEUTROPHILS # BLD AUTO: 2.44 X10(3) UL (ref 1.5–7.7)
NEUTROPHILS NFR BLD AUTO: 48.6 %
NONHDLC SERPL-MCNC: 94 MG/DL (ref ?–130)
OSMOLALITY SERPL CALC.SUM OF ELEC: 296 MOSM/KG (ref 275–295)
PLATELET # BLD AUTO: 221 10(3)UL (ref 150–450)
POTASSIUM SERPL-SCNC: 4.6 MMOL/L (ref 3.5–5.1)
PROT SERPL-MCNC: 6.9 G/DL (ref 5.7–8.2)
RBC # BLD AUTO: 4.75 X10(6)UL
SODIUM SERPL-SCNC: 142 MMOL/L (ref 136–145)
T PALLIDUM AB SER QL IA: NONREACTIVE
TRIGL SERPL-MCNC: 74 MG/DL (ref 30–149)
TSI SER-ACNC: 1.66 MIU/ML (ref 0.55–4.78)
VLDLC SERPL CALC-MCNC: 12 MG/DL (ref 0–30)
WBC # BLD AUTO: 5 X10(3) UL (ref 4–11)

## 2024-09-19 PROCEDURE — 86695 HERPES SIMPLEX TYPE 1 TEST: CPT | Performed by: NURSE PRACTITIONER

## 2024-09-19 PROCEDURE — 86708 HEPATITIS A ANTIBODY: CPT | Performed by: NURSE PRACTITIONER

## 2024-09-19 PROCEDURE — 87491 CHLMYD TRACH DNA AMP PROBE: CPT | Performed by: NURSE PRACTITIONER

## 2024-09-19 PROCEDURE — 80061 LIPID PANEL: CPT | Performed by: NURSE PRACTITIONER

## 2024-09-19 PROCEDURE — 80050 GENERAL HEALTH PANEL: CPT | Performed by: NURSE PRACTITIONER

## 2024-09-19 PROCEDURE — 86696 HERPES SIMPLEX TYPE 2 TEST: CPT | Performed by: NURSE PRACTITIONER

## 2024-09-19 PROCEDURE — 83036 HEMOGLOBIN GLYCOSYLATED A1C: CPT | Performed by: NURSE PRACTITIONER

## 2024-09-19 PROCEDURE — 87389 HIV-1 AG W/HIV-1&-2 AB AG IA: CPT | Performed by: NURSE PRACTITIONER

## 2024-09-19 PROCEDURE — 84410 TESTOSTERONE BIOAVAILABLE: CPT | Performed by: NURSE PRACTITIONER

## 2024-09-19 PROCEDURE — 86704 HEP B CORE ANTIBODY TOTAL: CPT | Performed by: NURSE PRACTITIONER

## 2024-09-19 PROCEDURE — 87340 HEPATITIS B SURFACE AG IA: CPT | Performed by: NURSE PRACTITIONER

## 2024-09-19 PROCEDURE — 80503 PATH CLIN CONSLTJ SF 5-20: CPT | Performed by: NURSE PRACTITIONER

## 2024-09-19 PROCEDURE — 86709 HEPATITIS A IGM ANTIBODY: CPT | Performed by: NURSE PRACTITIONER

## 2024-09-19 PROCEDURE — 87591 N.GONORRHOEAE DNA AMP PROB: CPT | Performed by: NURSE PRACTITIONER

## 2024-09-19 PROCEDURE — 86803 HEPATITIS C AB TEST: CPT | Performed by: NURSE PRACTITIONER

## 2024-09-19 PROCEDURE — 86780 TREPONEMA PALLIDUM: CPT | Performed by: NURSE PRACTITIONER

## 2024-09-19 PROCEDURE — 86706 HEP B SURFACE ANTIBODY: CPT | Performed by: NURSE PRACTITIONER

## 2024-09-20 LAB
HSV 1 GLYCOPROTEIN G, IGG: NEGATIVE
HSV 2 GLYCOPROTEIN G, IGG: NEGATIVE

## 2024-09-25 LAB
SEX HORM BIND GLOB: 39.5 NMOL/L
TESTOST % FREE+WEAK BND: 29.2 %
TESTOST FREE+WEAK BND: 118.9 NG/DL
TESTOSTERONE TOT /MS: 407.1 NG/DL

## 2024-10-21 ENCOUNTER — OFFICE VISIT (OUTPATIENT)
Dept: FAMILY MEDICINE CLINIC | Facility: CLINIC | Age: 39
End: 2024-10-21
Payer: COMMERCIAL

## 2024-10-21 VITALS — OXYGEN SATURATION: 97 % | BODY MASS INDEX: 30 KG/M2 | TEMPERATURE: 99 F | WEIGHT: 194 LBS | HEART RATE: 64 BPM

## 2024-10-21 DIAGNOSIS — J01.40 ACUTE NON-RECURRENT PANSINUSITIS: Primary | ICD-10-CM

## 2024-10-21 DIAGNOSIS — R05.1 ACUTE COUGH: ICD-10-CM

## 2024-10-21 PROCEDURE — 99213 OFFICE O/P EST LOW 20 MIN: CPT | Performed by: NURSE PRACTITIONER

## 2024-10-21 NOTE — PROGRESS NOTES
Chief Complaint:   Chief Complaint   Patient presents with    Follow - Up     Started 1 month ago, [facility] was told it might be sinus problem was given abx and finished, not getting better    Cough    Sore Throat    Body ache and/or chills     Body aches and sinus headaches    Runny Nose       HPI:   This is a 39 year old male coming in for continued sinus symptoms. Started feeling ill about 1 month ago. Went to the [facility] twice, the first when he had been sick 1 week and tested negative for COVID/flu. He went back a week later and they prescribed a Z-pack, which did not help. Currently reports sinus pressure in face, headaches, congestion, post-nasal drip, and a productive cough. No fever or chills. Denies sore throat or body aches. No n/v/d. Eating and drinking normally. Taking Sudafed and Mucinex PRN for symptoms.     Results for orders placed or performed in visit on 09/19/24   CBC With Differential With Platelet    Collection Time: 09/19/24  8:12 AM   Result Value Ref Range    WBC 5.0 4.0 - 11.0 x10(3) uL    RBC 4.75 4.30 - 5.70 x10(6)uL    HGB 14.7 13.0 - 17.5 g/dL    HCT 44.8 39.0 - 53.0 %    MCV 94.3 80.0 - 100.0 fL    MCH 30.9 26.0 - 34.0 pg    MCHC 32.8 31.0 - 37.0 g/dL    RDW-SD 46.1 35.1 - 46.3 fL    RDW 13.1 11.0 - 15.0 %    .0 150.0 - 450.0 10(3)uL    Neutrophil Absolute Prelim 2.44 1.50 - 7.70 x10 (3) uL    Neutrophil Absolute 2.44 1.50 - 7.70 x10(3) uL    Lymphocyte Absolute 1.91 1.00 - 4.00 x10(3) uL    Monocyte Absolute 0.44 0.10 - 1.00 x10(3) uL    Eosinophil Absolute 0.15 0.00 - 0.70 x10(3) uL    Basophil Absolute 0.05 0.00 - 0.20 x10(3) uL    Immature Granulocyte Absolute 0.03 0.00 - 1.00 x10(3) uL    Neutrophil % 48.6 %    Lymphocyte % 38.0 %    Monocyte % 8.8 %    Eosinophil % 3.0 %    Basophil % 1.0 %    Immature Granulocyte % 0.6 %   Comp Metabolic Panel (14)    Collection Time: 09/19/24  8:12 AM   Result Value Ref Range    Glucose 86 70 - 99 mg/dL    Sodium 142 136 - 145 mmol/L    Potassium  4.6 3.5 - 5.1 mmol/L    Chloride 109 98 - 112 mmol/L    CO2 31.0 21.0 - 32.0 mmol/L    Anion Gap 2 0 - 18 mmol/L    BUN 21 9 - 23 mg/dL    Creatinine 1.03 0.70 - 1.30 mg/dL    BUN/CREA Ratio 20.4 (H) 10.0 - 20.0    Calcium, Total 9.4 8.7 - 10.4 mg/dL    Calculated Osmolality 296 (H) 275 - 295 mOsm/kg    eGFR-Cr 96 >=60 mL/min/1.73m2    ALT 21 10 - 49 U/L    AST 21 <34 U/L    Alkaline Phosphatase 54 45 - 117 U/L    Bilirubin, Total 0.5 0.3 - 1.2 mg/dL    Total Protein 6.9 5.7 - 8.2 g/dL    Albumin 4.4 3.2 - 4.8 g/dL    Globulin  2.5 2.0 - 3.5 g/dL    A/G Ratio 1.8 1.0 - 2.0    Patient Fasting for CMP? Yes    Hemoglobin A1C    Collection Time: 09/19/24  8:12 AM   Result Value Ref Range    HgbA1C 5.3 <5.7 %    Estimated Average Glucose 105 68 - 126 mg/dL   Lipid Panel    Collection Time: 09/19/24  8:12 AM   Result Value Ref Range    Cholesterol, Total 153 <200 mg/dL    HDL Cholesterol 59 40 - 59 mg/dL    Triglycerides 74 30 - 149 mg/dL    LDL Cholesterol 80 <100 mg/dL    VLDL 12 0 - 30 mg/dL    Non HDL Chol 94 <130 mg/dL    Patient Fasting for Lipid? Yes    Assay, Thyroid Stim Hormone    Collection Time: 09/19/24  8:12 AM   Result Value Ref Range    TSH 1.662 0.550 - 4.780 mIU/mL   Testosterone,Total and Weakly Bound w/ SHBG    Collection Time: 09/19/24  8:12 AM   Result Value Ref Range    Testosterone Tot LC/.1 264.0 - 916.0 ng/dL    Testost % Free+Weak Bnd 29.2 9.0 - 46.0 %    Testost Free+Weak Bnd 118.9 40.0 - 250.0 ng/dL    Sex Horm Bind Glob 39.5 16.5 - 55.9 nmol/L   HSV 1 & 2 Glycoprotein Specific AB,IGG    Collection Time: 09/19/24  8:12 AM   Result Value Ref Range    HSV 1 Glycoprotein G, IgG Negative Negative    HSV 2 Glycoprotein G, IgG Negative Negative   T Pallidum Screening Cascade    Collection Time: 09/19/24  8:12 AM   Result Value Ref Range    Treponemal Antibodies Nonreactive Nonreactive    Hepatitis A B + C profile [E]    Collection Time: 09/19/24  8:12 AM   Result Value Ref Range    Hepatitis A  Virus Ab, Total Reactive (A) Nonreactive     Hepatitis B Surface Antigen Nonreactive Nonreactive     Hepatitis B Core Ab,Total Nonreactive Nonreactive     Hepatitis B Surface Antibody Reactive Reactive     Heptatitis B Surface Ab Quant >1,000 mIU/mL    Hepatitis C Virus Nonreactive Nonreactive    HIV Ag/Ab Combo    Collection Time: 09/19/24  8:12 AM   Result Value Ref Range    HIV Antigen Antibody Combo Non-Reactive Non-Reactive   Hep A AB, IGM    Collection Time: 09/19/24  8:12 AM   Result Value Ref Range    Hepatitis A Ab, IgM Nonreactive Nonreactive    Chlamydia/GC PCR Combo [E]    Collection Time: 09/19/24  8:12 AM    Specimen: Urine; Other   Result Value Ref Range    Chlamydia Trachomatis Amplified RNA Negative Negative    Neisseria Gonorrhoeae Amplified RNA Negative Negative    Chlam/GC Source Urine              Past Medical History:    Anxiety    Borderline personality disorder in adult (HCC)    Coccyx pain    Last Assessment & Plan:   Formatting of this note might be different from the original.  Active   Advised OTC ibuprofen   Continue use oc coccyx pillow   Hx of negative XR per patient   F/u if sx persist or worsen      Right hip pain    Shoulder pain, bilateral    Visual impairment     Past Surgical History:   Procedure Laterality Date    Upper gi endoscopy,exam       Social History:  Social History     Socioeconomic History    Marital status: Single   Tobacco Use    Smoking status: Never     Passive exposure: Never    Smokeless tobacco: Never   Vaping Use    Vaping status: Never Used   Substance and Sexual Activity    Alcohol use: Not Currently     Comment: QUIT 7/2023    Drug use: Not Currently     Types: Cannabis     Comment: QUIT 2 WEEKS AGO     Social Drivers of Health      Received from [facility]    [facility]ctions     Family History:  Family History   Problem Relation Age of Onset    Colon Cancer Paternal Grandfather       Allergies:  Allergies[1]  Current Meds:  Current Outpatient Medications   Medication Sig Dispense Refill    Pseudoephedrine HCl, Deter, 30 MG Oral Tablet Abuse-Deterrent Take by mouth.      amoxicillin clavulanate 875-125 MG Oral Tab Take 1 tablet by mouth 2 (two) times daily for 10 days. 20 tablet 0    lisdexamfetamine (VYVANSE) 20 MG Oral Cap Take 1 capsule (20 mg total) by mouth every morning. Generic or brand whichever is covered 30 capsule 0    lurasidone (LATUDA) 20 MG Oral Tab Take 1 tablet (20 mg total) by mouth daily with dinner. 30 tablet 0    QUEtiapine (SEROQUEL) 25 MG Oral Tab Take 1 tablet (25 mg total) by mouth nightly. 30 tablet 1    VYVANSE 20 MG Oral Cap Take 1 capsule (20 mg total) by mouth every morning. 30 capsule 0    tadalafil 5 MG Oral Tab Take 1 tablet (5 mg total) by mouth daily.      NON FORMULARY Jeni Yepez      omega-3 fatty acids 1000 MG Oral Cap       Misc Natural Products (GLUCOSAMINE CHOND CMP ADVANCED OR)       Magnesium Bisglycinate Dihyd Does not apply Powder         Counseling given: Not Answered       REVIEW OF SYSTEMS:   CONSTITUTIONAL:  Denies unusual weight gain/loss, fever, chills. +Fatigue.  HEENT:  +Congestion, facial pressure.  INTEGUMENTARY:  Denies rashes, itching, skin lesion.  CARDIOVASCULAR:  Denies chest pain, palpitations, edema, dyspnea on exertion or at rest.  RESPIRATORY:  Denies shortness of breath, wheezing. +Cough.  GASTROINTESTINAL:  Denies abdominal pain, nausea, vomiting, constipation, diarrhea, or blood in stool.  MUSCULOSKELETAL:  Denies body aches.  NEUROLOGICAL:  +Sinus headaches. No dizziness.    EXAM:   Pulse 64   Temp 98.7 °F (37.1 °C) (Oral)   Wt 194 lb (88 kg)   SpO2 97%   BMI 29.50 kg/m²  Estimated body mass index is 29.5 kg/m² as calculated from the following:    Height as of 9/9/24: 5' 8\" (1.727 m).    Weight as of this encounter: 194 lb (88 kg).   Vital signs reviewed.Appears stated age, well groomed, in no acute distress.  Physical  Exam:  GEN:  Patient is alert, awake and oriented, well developed, well nourished.  HEENT:  Head:  Normocephalic, atraumatic Eyes: EOMI, PERRLA, conjunctivae clear bilaterally, no eye discharge Ears: External normal, TM clear bilaterally. Nose: patent, no nasal discharge. Mild sinus tenderness over maxillary and frontal sinuses. Throat:  No tonsillar erythema or exudate.  Mouth:  No oral lesions, good dentition.  NECK: Supple, no CLAD, no thyromegaly.  SKIN: No rashes, no skin lesion, no bruising, good turgor.  HEART:  Regular rate and rhythm, no murmurs, rubs or gallops.  LUNGS: Clear to auscultation bilterally, no rales/rhonchi/wheezing.  NEURO:  No deficit, normal gait, strength and tone, sensory intact.    ASSESSMENT AND PLAN:   1. Acute non-recurrent pansinusitis  -Will treat with Augmentin BID x 10 days. To take with food. Side effects reviewed with patient. Should finish entire rx even if symptoms improved. Recommended daily probiotic/yogurt while on this medicine.  -Symptomatic treatment with PO fluids, humidifier, rest advised. May continue Mucinex and Sudafed PRN. Would recommend to add Claritin or Zyrtec daily, Flonase BID. Discussed proper technique to use.   -Follow-up 2 weeks if no improvement.  - amoxicillin clavulanate 875-125 MG Oral Tab; Take 1 tablet by mouth 2 (two) times daily for 10 days.  Dispense: 20 tablet; Refill: 0    2. Acute cough  -Symptomatic management as above, lungs clear to auscultation.      Meds & Refills for this Visit:  Requested Prescriptions     Signed Prescriptions Disp Refills    amoxicillin clavulanate 875-125 MG Oral Tab 20 tablet 0     Sig: Take 1 tablet by mouth 2 (two) times daily for 10 days.         Problem List:  Patient Active Problem List   Diagnosis    Chronic superficial gastritis without bleeding    Hemorrhoids    Moderate episode of recurrent major depressive disorder (HCC)    Urinary frequency    ELSI (generalized anxiety disorder)    Erectile dysfunction     Benign prostatic hyperplasia       Trinh Cabezas, APRN  10/21/2024  4:24 PM         [1]   Allergies  Allergen Reactions    Seasonal OTHER (SEE COMMENTS)

## 2024-11-14 ENCOUNTER — OFFICE VISIT (OUTPATIENT)
Dept: INTERNAL MEDICINE CLINIC | Facility: CLINIC | Age: 39
End: 2024-11-14
Payer: COMMERCIAL

## 2024-11-14 ENCOUNTER — HOSPITAL ENCOUNTER (OUTPATIENT)
Dept: GENERAL RADIOLOGY | Age: 39
Discharge: HOME | End: 2024-11-14
Attending: NURSE PRACTITIONER
Payer: COMMERCIAL

## 2024-11-14 VITALS
HEIGHT: 68 IN | DIASTOLIC BLOOD PRESSURE: 66 MMHG | WEIGHT: 188 LBS | HEART RATE: 58 BPM | BODY MASS INDEX: 28.49 KG/M2 | SYSTOLIC BLOOD PRESSURE: 100 MMHG

## 2024-11-14 DIAGNOSIS — G89.29 CHRONIC BILATERAL THORACIC BACK PAIN: ICD-10-CM

## 2024-11-14 DIAGNOSIS — M54.2 CERVICAL PAIN (NECK): ICD-10-CM

## 2024-11-14 DIAGNOSIS — M25.551 BILATERAL HIP PAIN: ICD-10-CM

## 2024-11-14 DIAGNOSIS — M25.552 BILATERAL HIP PAIN: ICD-10-CM

## 2024-11-14 DIAGNOSIS — G89.29 CHRONIC BILATERAL THORACIC BACK PAIN: Primary | ICD-10-CM

## 2024-11-14 DIAGNOSIS — M54.6 CHRONIC BILATERAL THORACIC BACK PAIN: ICD-10-CM

## 2024-11-14 DIAGNOSIS — M54.6 CHRONIC BILATERAL THORACIC BACK PAIN: Primary | ICD-10-CM

## 2024-11-14 PROCEDURE — 72040 X-RAY EXAM NECK SPINE 2-3 VW: CPT | Performed by: NURSE PRACTITIONER

## 2024-11-14 PROCEDURE — 99204 OFFICE O/P NEW MOD 45 MIN: CPT | Performed by: NURSE PRACTITIONER

## 2024-11-14 PROCEDURE — 3074F SYST BP LT 130 MM HG: CPT | Performed by: NURSE PRACTITIONER

## 2024-11-14 PROCEDURE — 3008F BODY MASS INDEX DOCD: CPT | Performed by: NURSE PRACTITIONER

## 2024-11-14 PROCEDURE — 73522 X-RAY EXAM HIPS BI 3-4 VIEWS: CPT | Performed by: NURSE PRACTITIONER

## 2024-11-14 PROCEDURE — 72110 X-RAY EXAM L-2 SPINE 4/>VWS: CPT | Performed by: NURSE PRACTITIONER

## 2024-11-14 PROCEDURE — 3078F DIAST BP <80 MM HG: CPT | Performed by: NURSE PRACTITIONER

## 2024-11-14 PROCEDURE — 72072 X-RAY EXAM THORAC SPINE 3VWS: CPT | Performed by: NURSE PRACTITIONER

## 2024-11-14 RX ORDER — CYCLOBENZAPRINE HCL 5 MG
5 TABLET ORAL NIGHTLY PRN
Qty: 10 TABLET | Refills: 0 | Status: SHIPPED | OUTPATIENT
Start: 2024-11-14

## 2024-11-14 NOTE — PROGRESS NOTES
Marko Kim is a 39 year old male.  HPI:   Pt is new to me. He reports in 2020 injury to thoracic back at chiropractor office. He reports there is fusion of T11 and T12 and scoliosis of 22 degrees. Pt reports he has been going to see a physical therapist over the past couple of years, seeking to see a specialist. He reports back spasms. States he has tried opiates in the past but did not have relief and preferred not to use them. He reports pain wakes him at night, difficult to get sleep. States his chiropractor advised him to see PCP to consider muscle relaxer at bedtime. Denies any saddle block anesthesia, bilateral sciatica, loss of bladder/bowel control.     Per Xray results 9/20/2021 of lumbar spine: \"1. No acute fracture or acute malalignment   2. Mild chronic wedge compression involving T11 and T12 with partial ossification of the disc space.    3. S shaped scoliosis and multilevel spondylosis.   Current Outpatient Medications   Medication Sig Dispense Refill    cyclobenzaprine 5 MG Oral Tab Take 1 tablet (5 mg total) by mouth nightly as needed. 10 tablet 0    LURASIDONE 20 MG Oral Tab TAKE 1 TABLET BY MOUTH DAILY WITH DINNER 30 tablet 0    Pseudoephedrine HCl, Deter, 30 MG Oral Tablet Abuse-Deterrent Take by mouth.      lisdexamfetamine (VYVANSE) 20 MG Oral Cap Take 1 capsule (20 mg total) by mouth every morning. Generic or brand whichever is covered 30 capsule 0    QUEtiapine (SEROQUEL) 25 MG Oral Tab Take 1 tablet (25 mg total) by mouth nightly. 30 tablet 1    VYVANSE 20 MG Oral Cap Take 1 capsule (20 mg total) by mouth every morning. 30 capsule 0    tadalafil 5 MG Oral Tab Take 1 tablet (5 mg total) by mouth daily.      omega-3 fatty acids 1000 MG Oral Cap       Misc Natural Products (GLUCOSAMINE CHOND CMP ADVANCED OR)       NON FORMULARY Jeni Yepez (Patient not taking: Reported on 11/14/2024)        Past Medical History:    Anxiety    Borderline personality disorder in adult (HCC)    Coccyx  pain    Last Assessment & Plan:   Formatting of this note might be different from the original.  Active   Advised OTC ibuprofen   Continue use oc coccyx pillow   Hx of negative XR per patient   F/u if sx persist or worsen      Right hip pain    Shoulder pain, bilateral    Visual impairment      Social History:  Social History     Socioeconomic History    Marital status: Single   Tobacco Use    Smoking status: Never     Passive exposure: Never    Smokeless tobacco: Never   Vaping Use    Vaping status: Never Used   Substance and Sexual Activity    Alcohol use: Not Currently     Comment: QUIT 7/2023    Drug use: Not Currently     Types: Cannabis     Comment: QUIT 2 WEEKS AGO     Social Ginx of Health      Received from [facility] and [facility]    [facility]        REVIEW OF SYSTEMS:   Review of Systems   Constitutional:  Negative for activity change, appetite change, fatigue and unexpected weight change.   HENT:  Negative for congestion and dental problem.    Eyes:  Negative for discharge and visual disturbance.   Respiratory:  Negative for cough, chest tightness, shortness of breath and wheezing.    Cardiovascular:  Negative for chest pain, palpitations and leg swelling.   Gastrointestinal:  Negative for abdominal pain, constipation, diarrhea, nausea and vomiting.   Endocrine: Negative.    Genitourinary:  Negative for decreased urine volume, difficulty urinating and frequency.   Musculoskeletal:  Positive for arthralgias and back pain.   Skin:  Negative for color change, pallor and rash.   Neurological:  Negative for dizziness, seizures, numbness and headaches.   Psychiatric/Behavioral:  Negative for behavioral problems, dysphoric mood and suicidal ideas.           EXAM:   /66 (BP Location: Left arm, Patient Position: Sitting, Cuff Size: large)   Pulse 58   Ht 5' 8\" (1.727 m)   Wt 188 lb (85.3 kg)   BMI 28.59 kg/m²     Physical Exam  Vitals reviewed.    Constitutional:       General: He is not in acute distress.  HENT:      Head: Normocephalic.   Eyes:      General: No scleral icterus.     Conjunctiva/sclera: Conjunctivae normal.   Musculoskeletal:      Cervical back: Normal range of motion and neck supple. No swelling, erythema, rigidity, spasms, tenderness, bony tenderness or crepitus. No pain with movement. Normal range of motion.      Thoracic back: Tenderness present. No swelling, edema, spasms or bony tenderness. Normal range of motion.      Lumbar back: Tenderness present. No bony tenderness. Normal range of motion. Negative right straight leg raise test and negative left straight leg raise test.      Right hip: Tenderness present. No deformity, lacerations, bony tenderness or crepitus. Normal range of motion. Normal strength.      Left hip: Tenderness present. No deformity, lacerations, bony tenderness or crepitus. Normal range of motion. Normal strength.      Comments: R hip pain reported on internal rotation  Left hip pain reported on external rotation   Lymphadenopathy:      Cervical: No cervical adenopathy.   Skin:     General: Skin is warm.      Coloration: Skin is not jaundiced.      Findings: No bruising or erythema.   Neurological:      General: No focal deficit present.      Mental Status: He is alert and oriented to person, place, and time.      Cranial Nerves: No cranial nerve deficit.      Motor: No weakness.      Gait: Gait normal.   Psychiatric:         Mood and Affect: Mood normal.         Behavior: Behavior normal.         Thought Content: Thought content normal.         Judgment: Judgment normal.            ASSESSMENT AND PLAN:   1. Chronic bilateral thoracic back pain  -Reviewed imaging results from 2021.  -No red flags.  -Trial of cyclobenzaprine. Reviewed dose and s/e. May cause drowsiness.   -Referral for imaging and physiatry.   - PHYSIATRY - INTERNAL  - XR LUMBAR SPINE (MIN 4 VIEWS) (CPT=72110); Future  - XR THORACIC SPINE (3 VIEWS)  (CPT=72072); Future  - cyclobenzaprine 5 MG Oral Tab; Take 1 tablet (5 mg total) by mouth nightly as needed.  Dispense: 10 tablet; Refill: 0    2. Bilateral hip pain  -as above #1  - cyclobenzaprine 5 MG Oral Tab; Take 1 tablet (5 mg total) by mouth nightly as needed.  Dispense: 10 tablet; Refill: 0  - XR HIP W OR WO PELVIS 3 OR 4 VIEWS, BILAT(CPT=73522); Future    3. Cervical pain (neck)  -as above #1  - cyclobenzaprine 5 MG Oral Tab; Take 1 tablet (5 mg total) by mouth nightly as needed.  Dispense: 10 tablet; Refill: 0  - XR CERVICAL SPINE (2-3 VIEWS) (CPT=72040); Future       The patient indicates understanding of these issues and agrees to the plan.  The patient is asked to return in PRN/annual exam due with PCP.     The above note was creating using Dragon speech recognition technology. Please excuse any typos.

## 2024-11-18 ENCOUNTER — OFFICE VISIT (OUTPATIENT)
Dept: INTERNAL MEDICINE CLINIC | Facility: CLINIC | Age: 39
End: 2024-11-18
Payer: COMMERCIAL

## 2024-11-18 VITALS
HEART RATE: 64 BPM | HEIGHT: 68 IN | DIASTOLIC BLOOD PRESSURE: 62 MMHG | BODY MASS INDEX: 28.64 KG/M2 | SYSTOLIC BLOOD PRESSURE: 104 MMHG | WEIGHT: 189 LBS

## 2024-11-18 DIAGNOSIS — J01.00 SUBACUTE MAXILLARY SINUSITIS: ICD-10-CM

## 2024-11-18 DIAGNOSIS — Z13.0 SCREENING FOR DEFICIENCY ANEMIA: ICD-10-CM

## 2024-11-18 DIAGNOSIS — Z13.29 THYROID DISORDER SCREEN: ICD-10-CM

## 2024-11-18 DIAGNOSIS — N40.0 BENIGN PROSTATIC HYPERPLASIA WITHOUT LOWER URINARY TRACT SYMPTOMS: ICD-10-CM

## 2024-11-18 DIAGNOSIS — D72.819 LEUKOPENIA, UNSPECIFIED TYPE: ICD-10-CM

## 2024-11-18 DIAGNOSIS — Z00.00 WELLNESS EXAMINATION: Primary | ICD-10-CM

## 2024-11-18 DIAGNOSIS — Z13.220 LIPID SCREENING: ICD-10-CM

## 2024-11-18 RX ORDER — DOXYCYCLINE 100 MG/1
100 CAPSULE ORAL 2 TIMES DAILY
Qty: 14 CAPSULE | Refills: 0 | Status: SHIPPED | OUTPATIENT
Start: 2024-11-18 | End: 2024-11-25

## 2024-11-19 NOTE — PROGRESS NOTES
Marko Kmi is a 39 year old male.  HPI:   Pt reports recent sinus infection 9/2024, WBC was slightly low but recent recheck shows WNL (5.0). He reports at that time he was tx with Zpak, sx did not improve after a few weeks, saw his PCP and was tx with Augmentin. Reports sx improved with abx, flonase and saline, however they did not full resolve and now has resumed to have sinus pressure, green discharge/nasal congestion with PND.   Requests annual physical, UTD on his labs. Reports routine STI checks, declines any testing today, reports polyamorous. Denies any concerns at this time.   Current Outpatient Medications   Medication Sig Dispense Refill    doxycycline 100 MG Oral Cap Take 1 capsule (100 mg total) by mouth 2 (two) times daily for 7 days. 14 capsule 0    cyclobenzaprine 5 MG Oral Tab Take 1 tablet (5 mg total) by mouth nightly as needed. 10 tablet 0    LURASIDONE 20 MG Oral Tab TAKE 1 TABLET BY MOUTH DAILY WITH DINNER 30 tablet 0    Pseudoephedrine HCl, Deter, 30 MG Oral Tablet Abuse-Deterrent Take by mouth.      lisdexamfetamine (VYVANSE) 20 MG Oral Cap Take 1 capsule (20 mg total) by mouth every morning. Generic or brand whichever is covered 30 capsule 0    QUEtiapine (SEROQUEL) 25 MG Oral Tab Take 1 tablet (25 mg total) by mouth nightly. 30 tablet 1    VYVANSE 20 MG Oral Cap Take 1 capsule (20 mg total) by mouth every morning. 30 capsule 0    tadalafil 5 MG Oral Tab Take 1 tablet (5 mg total) by mouth daily.      omega-3 fatty acids 1000 MG Oral Cap       Misc Natural Products (GLUCOSAMINE CHOND CMP ADVANCED OR)       NON FORMULARY Jeni Yepez (Patient not taking: Reported on 11/18/2024)        Past Medical History:    Anxiety    Borderline personality disorder in adult (HCC)    Coccyx pain    Last Assessment & Plan:   Formatting of this note might be different from the original.  Active   Advised OTC ibuprofen   Continue use oc coccyx pillow   Hx of negative XR per patient   F/u if sx persist  or worsen      Right hip pain    Shoulder pain, bilateral    Visual impairment      Social History:  Social History     Socioeconomic History    Marital status: Single   Tobacco Use    Smoking status: Never     Passive exposure: Never    Smokeless tobacco: Never   Vaping Use    Vaping status: Never Used   Substance and Sexual Activity    Alcohol use: Not Currently     Comment: QUIT 7/2023    Drug use: Not Currently     Types: Cannabis     Comment: QUIT 2 WEEKS AGO     Social Drivers of Health      Received from [facility] and [facility]    [facility]        REVIEW OF SYSTEMS:   Review of Systems   Constitutional:  Negative for activity change, appetite change, fatigue and unexpected weight change.   HENT:  Positive for congestion, postnasal drip and sinus pressure. Negative for dental problem.    Eyes:  Negative for discharge and visual disturbance.   Respiratory:  Negative for cough, chest tightness, shortness of breath and wheezing.    Cardiovascular:  Negative for chest pain, palpitations and leg swelling.   Gastrointestinal:  Negative for abdominal pain, constipation, diarrhea, nausea and vomiting.   Endocrine: Negative.    Genitourinary:  Negative for decreased urine volume, difficulty urinating and frequency.   Skin:  Negative for color change, pallor and rash.   Neurological:  Negative for dizziness, seizures, numbness and headaches.   Psychiatric/Behavioral:  Negative for behavioral problems, dysphoric mood and suicidal ideas.           EXAM:   /62 (BP Location: Left arm, Patient Position: Sitting, Cuff Size: adult)   Pulse 64   Ht 5' 8\" (1.727 m)   Wt 189 lb (85.7 kg)   BMI 28.74 kg/m²     Physical Exam  Vitals reviewed.   Constitutional:       General: He is not in acute distress.     Appearance: Normal appearance. He is not ill-appearing, toxic-appearing or diaphoretic.   HENT:      Head: Normocephalic and atraumatic.      Right Ear: Tympanic membrane,  ear canal and external ear normal. There is no impacted cerumen.      Left Ear: Tympanic membrane, ear canal and external ear normal. There is no impacted cerumen.      Nose: Congestion present. No rhinorrhea.      Mouth/Throat:      Mouth: Mucous membranes are moist.      Pharynx: Oropharynx is clear. No oropharyngeal exudate or posterior oropharyngeal erythema.   Eyes:      General: No scleral icterus.        Right eye: No discharge.         Left eye: No discharge.      Extraocular Movements: Extraocular movements intact.      Conjunctiva/sclera: Conjunctivae normal.      Pupils: Pupils are equal, round, and reactive to light.   Neck:      Thyroid: No thyroid mass or thyromegaly.   Cardiovascular:      Rate and Rhythm: Normal rate and regular rhythm.      Pulses: Normal pulses.      Heart sounds: Normal heart sounds.   Pulmonary:      Effort: Pulmonary effort is normal. No respiratory distress.      Breath sounds: Normal breath sounds.   Abdominal:      General: Abdomen is flat. Bowel sounds are normal. There is no distension.      Palpations: Abdomen is soft. There is no mass.      Tenderness: There is no abdominal tenderness.   Musculoskeletal:         General: Normal range of motion.      Cervical back: Normal range of motion. No rigidity or tenderness.      Right lower leg: No edema.      Left lower leg: No edema.   Lymphadenopathy:      Cervical: No cervical adenopathy.      Upper Body:      Right upper body: No supraclavicular adenopathy.      Left upper body: No supraclavicular adenopathy.   Skin:     General: Skin is warm and dry.      Capillary Refill: Capillary refill takes less than 2 seconds.      Coloration: Skin is not jaundiced.   Neurological:      General: No focal deficit present.      Mental Status: He is alert and oriented to person, place, and time.   Psychiatric:         Mood and Affect: Mood normal.         Judgment: Judgment normal.            ASSESSMENT AND PLAN:   1. Wellness  examination  Education provided on healthy lifestyle.  Diet: reduce saturated fats, simple carbs and excess sugars. Hydrate. Leafy greens, legumes, nuts/seeds, healthy sources of Omega 3, lean proteins, complex carbs, berries.   Exercise 30 min daily cardio as tolerated.   Immunizations reviewed and recommendations provided  Preventative health screening recommendations reviewed.   Previous lab and imaging results reviewed.    - Comp Metabolic Panel (14); Future  - CBC With Differential With Platelet; Future  - Lipid Panel; Future  - TSH W Reflex To Free T4; Future    2. Leukopenia, unspecified type  -likely reactive/viral, will recheck CBC.   - CBC W Differential W Platelet [E]; Future    3. Subacute maxillary sinusitis  -Start doxycycline. Reviewed use.   Nasal saline 2 sprays in each nostril every 3-4 hours as needed.   Flonase 1-2 sprays in each nostril once daily.   Tylenol 500 mg every 6-8 hours as needed (max dose 3000 mg/day).   Hydrate, humidifier, rest, honey/elderberry   If sx persist, see ENT. Would benefit from CT sinus. Advised to f/u.   - doxycycline 100 MG Oral Cap; Take 1 capsule (100 mg total) by mouth 2 (two) times daily for 7 days.  Dispense: 14 capsule; Refill: 0  - ENT Referral - In Network    4. Benign prostatic hyperplasia without lower urinary tract symptoms  -Asymptomatic, will check PSA  - PSA (Screening) [E]; Future    5. Lipid screening  - Lipid Panel; Future    6. Screening for deficiency anemia  - CBC With Differential With Platelet; Future    7. Thyroid disorder screen  - TSH W Reflex To Free T4; Future       The patient indicates understanding of these issues and agrees to the plan.  The patient is asked to return in 6 months.     The above note was creating using Dragon speech recognition technology. Please excuse any typos.

## 2024-12-05 ENCOUNTER — OFFICE VISIT (OUTPATIENT)
Dept: PHYSICAL MEDICINE AND REHAB | Facility: CLINIC | Age: 39
End: 2024-12-05
Payer: COMMERCIAL

## 2024-12-05 VITALS — HEIGHT: 68 IN | WEIGHT: 185 LBS | BODY MASS INDEX: 28.04 KG/M2

## 2024-12-05 DIAGNOSIS — S73.199A TEAR OF ACETABULAR LABRUM, UNSPECIFIED LATERALITY, INITIAL ENCOUNTER: Primary | ICD-10-CM

## 2024-12-05 DIAGNOSIS — F41.1 GAD (GENERALIZED ANXIETY DISORDER): ICD-10-CM

## 2024-12-05 DIAGNOSIS — G89.4 CHRONIC PAIN SYNDROME: ICD-10-CM

## 2024-12-05 DIAGNOSIS — F33.1 MODERATE EPISODE OF RECURRENT MAJOR DEPRESSIVE DISORDER (HCC): ICD-10-CM

## 2024-12-05 PROCEDURE — 3008F BODY MASS INDEX DOCD: CPT | Performed by: PHYSICAL MEDICINE & REHABILITATION

## 2024-12-05 PROCEDURE — 99204 OFFICE O/P NEW MOD 45 MIN: CPT | Performed by: PHYSICAL MEDICINE & REHABILITATION

## 2024-12-05 RX ORDER — DULOXETIN HYDROCHLORIDE 20 MG/1
20 CAPSULE, DELAYED RELEASE ORAL DAILY
Qty: 30 CAPSULE | Refills: 0 | Status: SHIPPED | OUTPATIENT
Start: 2024-12-05

## 2024-12-05 NOTE — PROGRESS NOTES
[facility]  NEW PATIENT EVALUATION    Consultation as a request of Angeles Alfonso      HISTORY OF PRESENT ILLNESS:     Chief Complaint   Patient presents with    Back Pain     New patient, referred by DEMARCUS Kern, comes in with upper/mid/low back pain. Also c/o of bilateral hip, bilateral knee and bilateral shoulder pain. Denies T/N. Admits weakness. Rates pain 4/10. Imaging done. Currently in PT for for R shoulder and L leg. Cyclobenzaprine PRN.       The patient is a 39 year old male with significant past medical history of anxiety, depression who presents with chronic pain syndrome.  Patient denies any specific injury or trauma.  He has had many years of pain in the spine as well as the hips bilaterally and in the knees.  He has seen various different providers in the past including orthopedics and was told he has fibromyalgia.  Patient was not satisfied with this diagnosis.  He has been managing his symptoms with various treatments including chiropractic treatment and physical therapy for the last 4 years twice a week without any significant change.  He has had diagnostic imaging completed in 2020 including x-ray and MRI imaging of the hips and has had bursa injection in the hips as well as hip joint injection without significant relief.  He states the pain is aggravated with any increased activity but also present at baseline intermittently cannot identify specific exacerbating or relieving factors.  He is currently in PT for the right shoulder and left leg pain.  He is taking cyclobenzaprine as needed at nighttime which helps with sleep.  He follows with psychiatry as well for management of depression and was recently taken off antidepressants as he is feeling better with his mood symptoms.  Rates pain to be 4 out of 10.  Pain is aching throbbing and sharp in nature at times.  There is no specific radiation of the symptoms.  He denies any stiffness or  swelling in the joints.  He has seen rheumatology in the past as well.  Denies any fevers or chills.  He works a sedentary desk job.  He has had x-ray imaging recently of the spine.    PHYSICAL EXAM:   Ht 68\"   Wt 185 lb (83.9 kg)   BMI 28.13 kg/m²     Gait  Able to toe walk and heel walk without any difficulty    LUMBAR SPINE:  Inspection: no erythema, swelling, or obvious deformity.  Their iliac crest and shoulder heights are symmetrical.     Palpation: Non tender to palpation of the spinous process.   ROM: FAROM  Strength: 5/5 in bilateral lower extremities  FADIR: Positive  Mark: Negative  Sensation: Intact to light touch in all dermatomes of the lower extremities  Reflexes: 2/4 at L4 and S1  Facet Loading: no specific facet pain  Straight leg raise: negative for radicular pain symptoms  Slump test: negative for pain symptoms for radicular pain symptoms      IMAGING:     X-ray thoracic spine completed 11/14/2024 was reviewed which is over for chronic deformity of the T11 vertebral body with compression fracture.  There is moderate levoscoliosis of the thoracic spine.  There is mild levoscoliosis of the lumbar spine as well    All imaging results were reviewed and discussed with patient.      ASSESSMENT/PLAN:     1. Tear of acetabular labrum, unspecified laterality, initial encounter    2. Moderate episode of recurrent major depressive disorder (HCC)    3. ELSI (generalized anxiety disorder)    4. Chronic pain syndrome        Marko Kim is a 39-year-old male presenting today for evaluation of chronic spine pain as well as hip and knee pain.  His symptoms seem to be more myofascial in nature for which I recommended Cymbalta 20 mg daily.  We discussed further investigative imaging with MRI of the hips given no response to PT and exercises as well as oral medications and he does have pain with FADIR testing concerning for labral tear injury.  We discussed we could consider ultrasound-guided hip joint  injections in the future.  Recommend follow-up after MRI imaging is completed.  He has had extensive PT and chiropractic treatment in the past.  We discussed we could consider acupuncture treatment as well which patient states he has tried in the past and would like to defer for now.      The patient verbalized understanding with the plan and was in agreement. All questions/concerns were addressed and there were no barriers to learning.  Please note Dragon dictation software was used to dictate this note and may result in inadvertent typos.    Eyad Saldaña DO, FAAPMR & CAQSM  Physical Medicine and Rehabilitation  Sports and Spine Medicine    PAST MEDICAL HISTORY:     Past Medical History:    Anxiety    Borderline personality disorder in adult (HCC)    Coccyx pain    Last Assessment & Plan:   Formatting of this note might be different from the original.  Active   Advised OTC ibuprofen   Continue use oc coccyx pillow   Hx of negative XR per patient   F/u if sx persist or worsen      Right hip pain    Shoulder pain, bilateral    Visual impairment         PAST SURGICAL HISTORY:     Past Surgical History:   Procedure Laterality Date    Upper gi endoscopy,exam           CURRENT MEDICATIONS:     Current Outpatient Medications   Medication Sig Dispense Refill    DULoxetine (CYMBALTA) 20 MG Oral Cap DR Particles Take 1 capsule (20 mg total) by mouth daily. 30 capsule 0    cyclobenzaprine 5 MG Oral Tab Take 1 tablet (5 mg total) by mouth nightly as needed. 10 tablet 0    LURASIDONE 20 MG Oral Tab TAKE 1 TABLET BY MOUTH DAILY WITH DINNER 30 tablet 0    Pseudoephedrine HCl, Deter, 30 MG Oral Tablet Abuse-Deterrent Take by mouth.      lisdexamfetamine (VYVANSE) 20 MG Oral Cap Take 1 capsule (20 mg total) by mouth every morning. Generic or brand whichever is covered 30 capsule 0    QUEtiapine (SEROQUEL) 25 MG Oral Tab Take 1 tablet (25 mg total) by mouth nightly. 30 tablet 1    VYVANSE 20 MG Oral Cap Take 1 capsule (20 mg total)  by mouth every morning. 30 capsule 0    tadalafil 5 MG Oral Tab Take 1 tablet (5 mg total) by mouth daily.      NON FORMULARY Jeni Yepez (Patient not taking: Reported on 11/18/2024)      omega-3 fatty acids 1000 MG Oral Cap       Misc Natural Products (GLUCOSAMINE CHOND CMP ADVANCED OR)            ALLERGIES:   Allergies[1]      FAMILY HISTORY:     Family History   Problem Relation Age of Onset    Colon Cancer Paternal Grandfather           SOCIAL HISTORY:     Social History     Socioeconomic History    Marital status: Single   Tobacco Use    Smoking status: Never     Passive exposure: Never    Smokeless tobacco: Never   Vaping Use    Vaping status: Never Used   Substance and Sexual Activity    Alcohol use: Not Currently     Comment: QUIT 7/2023    Drug use: Not Currently     Types: Cannabis     Comment: QUIT 2 WEEKS AGO     Social Portr of Health      Received from [facility] and [employer] [facility]    [facility] [place] [employer] [facility]          REVIEW OF SYSTEMS:   No patient-reported data collected this visit.      PHYSICAL EXAM:   General: No immediate distress  Head: Normocephalic/ Atraumatic  Eyes: Extra-occular movements intact.   Ears: No auricular hematoma or deformities  Mouth: No lesions or ulcerations  Heart: peripheral pulses intact. Normal capillary refill.   Lungs: Non-labored respirations  Abdomen: No abdominal guarding  Extremities: No lower extremity edema bilaterally   Skin: No lesions noted   Cognition: alert & oriented x 3, attentive, able to follow 2 step commands, comprehention intact, spontaneous speech intact  Psychiatric: Mood and affect appropriate      LABS:     Lab Results   Component Value Date     09/19/2024    A1C 5.3 09/19/2024     Lab Results   Component Value Date    WBC 5.0 09/19/2024    RBC 4.75 09/19/2024    HGB 14.7 09/19/2024    HCT 44.8 09/19/2024    MCV 94.3 09/19/2024    MCH 30.9 09/19/2024    MCHC 32.8 09/19/2024    RDW 13.1 09/19/2024    .0  09/19/2024     Lab Results   Component Value Date    GLU 86 09/19/2024    BUN 21 09/19/2024    BUNCREA 20.4 (H) 09/19/2024    CREATSERUM 1.03 09/19/2024    ANIONGAP 2 09/19/2024    CA 9.4 09/19/2024    OSMOCALC 296 (H) 09/19/2024    ALKPHO 54 09/19/2024    AST 21 09/19/2024    ALT 21 09/19/2024    BILT 0.5 09/19/2024    TP 6.9 09/19/2024    ALB 4.4 09/19/2024    GLOBULIN 2.5 09/19/2024    AGRATIO 1.9 04/12/2007     09/19/2024    K 4.6 09/19/2024     09/19/2024    CO2 31.0 09/19/2024     No results found for: \"PTP\", \"PT\", \"INR\"  No results found for: \"VITD\", \"QVITD\", \"KKSV11GB\"                 [1]   Allergies  Allergen Reactions    Seasonal OTHER (SEE COMMENTS)

## 2024-12-18 ENCOUNTER — PATIENT MESSAGE (OUTPATIENT)
Dept: INTERNAL MEDICINE CLINIC | Facility: CLINIC | Age: 39
End: 2024-12-18

## 2024-12-24 ENCOUNTER — TELEPHONE (OUTPATIENT)
Dept: PHYSICAL MEDICINE AND REHAB | Facility: CLINIC | Age: 39
End: 2024-12-24

## 2025-01-09 ENCOUNTER — OFFICE VISIT (OUTPATIENT)
Dept: INTERNAL MEDICINE CLINIC | Facility: CLINIC | Age: 40
End: 2025-01-09
Payer: COMMERCIAL

## 2025-01-09 ENCOUNTER — HOSPITAL ENCOUNTER (OUTPATIENT)
Dept: GENERAL RADIOLOGY | Age: 40
Discharge: HOME | End: 2025-01-09
Attending: NURSE PRACTITIONER
Payer: COMMERCIAL

## 2025-01-09 VITALS
RESPIRATION RATE: 16 BRPM | HEART RATE: 60 BPM | WEIGHT: 180 LBS | SYSTOLIC BLOOD PRESSURE: 108 MMHG | DIASTOLIC BLOOD PRESSURE: 64 MMHG | BODY MASS INDEX: 27 KG/M2

## 2025-01-09 DIAGNOSIS — M79.641 PAIN OF RIGHT HAND: ICD-10-CM

## 2025-01-09 DIAGNOSIS — R09.82 POST-NASAL DRIP: ICD-10-CM

## 2025-01-09 DIAGNOSIS — G89.29 CHRONIC BILATERAL THORACIC BACK PAIN: ICD-10-CM

## 2025-01-09 DIAGNOSIS — M25.552 BILATERAL HIP PAIN: ICD-10-CM

## 2025-01-09 DIAGNOSIS — M79.641 PAIN OF RIGHT HAND: Primary | ICD-10-CM

## 2025-01-09 DIAGNOSIS — M25.551 BILATERAL HIP PAIN: ICD-10-CM

## 2025-01-09 DIAGNOSIS — M54.6 CHRONIC BILATERAL THORACIC BACK PAIN: ICD-10-CM

## 2025-01-09 PROCEDURE — 90656 IIV3 VACC NO PRSV 0.5 ML IM: CPT | Performed by: NURSE PRACTITIONER

## 2025-01-09 PROCEDURE — 90471 IMMUNIZATION ADMIN: CPT | Performed by: NURSE PRACTITIONER

## 2025-01-09 PROCEDURE — 99214 OFFICE O/P EST MOD 30 MIN: CPT | Performed by: NURSE PRACTITIONER

## 2025-01-09 PROCEDURE — 3078F DIAST BP <80 MM HG: CPT | Performed by: NURSE PRACTITIONER

## 2025-01-09 PROCEDURE — 73130 X-RAY EXAM OF HAND: CPT | Performed by: NURSE PRACTITIONER

## 2025-01-09 PROCEDURE — 3074F SYST BP LT 130 MM HG: CPT | Performed by: NURSE PRACTITIONER

## 2025-01-09 RX ORDER — AZELASTINE 1 MG/ML
2 SPRAY, METERED NASAL 2 TIMES DAILY
Qty: 30 ML | Refills: 0 | Status: SHIPPED | OUTPATIENT
Start: 2025-01-09

## 2025-01-09 NOTE — PROGRESS NOTES
Marko Kim is a 39 year old male.  HPI:   Pt requests referral to see ENT for chronic PND. He reports trying nasal saline, flonase and antihistamines, no improvement reported. No facial pain, sinus pressure or swelling.   He is requesting to see ortho for his thoracic back and bilateral hip pain. This is a chronic problem. Chronic pain. Tx in the past with chiropractor. He reports trying PT. Compelte Xray and MRI imaging. Was referred to physiatry but states he would like to see ortho. Pt also reports ice skating a few weeks ago and fell onto his R hand. Reports pain only when trying to do a push up, unable to bear weight on the R side. Otherwise no swelling, redness, bruising. Reports FROM.   Denies any CP, SOB, HA, dizziness, vision changes, palpitations, paresthesias, saddle block anesthesia, bilateral sciatica, loss of bladder/bowel control.   Current Outpatient Medications   Medication Sig Dispense Refill    albuterol 108 (90 Base) MCG/ACT Inhalation Aero Soln       azelastine 0.1 % Nasal Solution 2 sprays by Nasal route 2 (two) times daily. 30 mL 0    DULoxetine (CYMBALTA) 20 MG Oral Cap DR Particles Take 1 capsule (20 mg total) by mouth daily. 30 capsule 1    QUEtiapine (SEROQUEL) 25 MG Oral Tab Take 1 tablet (25 mg total) by mouth nightly. 30 tablet 1    risperiDONE (RISPERDAL) 0.5 MG Oral Tab Take one tab qam and two tabs at bedtime po. This is an increase. 90 tablet 1    lisdexamfetamine (VYVANSE) 20 MG Oral Cap Take 1 capsule (20 mg total) by mouth every morning. 30 capsule 0    cyclobenzaprine 5 MG Oral Tab Take 1 tablet (5 mg total) by mouth nightly as needed. 10 tablet 0    Pseudoephedrine HCl, Deter, 30 MG Oral Tablet Abuse-Deterrent Take by mouth.      lisdexamfetamine (VYVANSE) 20 MG Oral Cap Take 1 capsule (20 mg total) by mouth every morning. Generic or brand whichever is covered 30 capsule 0    tadalafil 5 MG Oral Tab Take 1 tablet (5 mg total) by mouth daily.      NON FORMULARY Tongkat  Ali      omega-3 fatty acids 1000 MG Oral Cap       Misc Natural Products (GLUCOSAMINE CHOND CMP ADVANCED OR)         Past Medical History:    Anxiety    Borderline personality disorder in adult (HCC)    Coccyx pain    Last Assessment & Plan:   Formatting of this note might be different from the original.  Active   Advised OTC ibuprofen   Continue use oc coccyx pillow   Hx of negative XR per patient   F/u if sx persist or worsen      Right hip pain    Shoulder pain, bilateral    Visual impairment      Social History:  Social History     Socioeconomic History    Marital status: Single   Tobacco Use    Smoking status: Never     Passive exposure: Never    Smokeless tobacco: Never   Vaping Use    Vaping status: Never Used   Substance and Sexual Activity    Alcohol use: Not Currently     Comment: QUIT 7/2023    Drug use: Not Currently     Types: Cannabis     Comment: QUIT 2 WEEKS AGO     Social Drivers of Health     Food Insecurity: No Food Insecurity (1/9/2025)    NCSS - Food Insecurity     Worried About Running Out of Food in the Last Year: No     Ran Out of Food in the Last Year: No   Transportation Needs: No Transportation Needs (1/9/2025)    NCSS - Transportation     Lack of Transportation: No    Received from [facility] and [facility]    [facility] [place] [facility]   Housing Stability: Not At Risk (1/9/2025)    NCSS - Housing/Utilities     Has Housing: Yes     Worried About Losing Housing: No     Unable to Get Utilities: No        REVIEW OF SYSTEMS:   Review of Systems   All other systems reviewed and are negative.         EXAM:   /64 (BP Location: Left arm, Patient Position: Sitting, Cuff Size: adult)   Pulse 60   Resp 16   Wt 180 lb (81.6 kg)   BMI 27.37 kg/m²     Physical Exam  Vitals reviewed.   Constitutional:       Appearance: Normal appearance.   HENT:      Head: Normocephalic.      Nose: Nose normal.      Mouth/Throat:      Mouth: Mucous membranes are moist.      Pharynx:  Oropharynx is clear.      Comments: Mild PND  Eyes:      General: No scleral icterus.        Right eye: No discharge.      Conjunctiva/sclera: Conjunctivae normal.      Pupils: Pupils are equal, round, and reactive to light.   Neck:      Thyroid: No thyroid mass or thyromegaly.   Pulmonary:      Effort: Pulmonary effort is normal. No respiratory distress.   Musculoskeletal:      Right wrist: Normal.      Right hand: Normal.      Cervical back: Neck supple.   Lymphadenopathy:      Upper Body:      Right upper body: No supraclavicular adenopathy.      Left upper body: No supraclavicular adenopathy.   Skin:     General: Skin is warm and dry.      Capillary Refill: Capillary refill takes less than 2 seconds.      Coloration: Skin is not jaundiced.      Findings: No bruising or erythema.   Neurological:      General: No focal deficit present.      Mental Status: He is alert and oriented to person, place, and time.      Motor: No weakness.      Gait: Gait normal.   Psychiatric:         Mood and Affect: Mood normal.         Judgment: Judgment normal.            ASSESSMENT AND PLAN:   1. Pain of right hand  -Imaging ordered  -NSAID BID for 5-7 days, warm compress  -Referral to hand specialist.   - XR HAND (MIN 3 VIEWS), RIGHT (CPT=73130); Future  - Hand & Microvascular Surgery Referral - [facility] (Dr. Jarrell)    2. Post-nasal drip  -Referral to ENT per patient request  -Tried flonase, nasal saline and antihistamine PO without relief.   -Rx sent for Azelastine spray. Reviewed use.   - ENT Referral - In Network    3. Bilateral hip pain  -referral to ortho per patient request  - Ortho Referral - In Network    4. Chronic bilateral thoracic back pain  -referral to spinal specialist. Per patient request.   -No red flags.   - Ortho Referral - In Network       The patient indicates understanding of these issues and agrees to the plan.  The patient is asked to return in PRN.     The above note was creating using Dragon  speech recognition technology. Please excuse any typos.

## 2025-01-10 NOTE — TELEPHONE ENCOUNTER
Radiologist report placed in Dr. Saldaña's mailbox for his review. Report can be sent to scanning once review is completed.

## 2025-01-21 ENCOUNTER — TELEPHONE (OUTPATIENT)
Dept: PHYSICAL MEDICINE AND REHAB | Facility: CLINIC | Age: 40
End: 2025-01-21

## 2025-01-21 ENCOUNTER — OFFICE VISIT (OUTPATIENT)
Dept: PHYSICAL MEDICINE AND REHAB | Facility: CLINIC | Age: 40
End: 2025-01-21
Payer: COMMERCIAL

## 2025-01-21 VITALS — WEIGHT: 180 LBS | HEIGHT: 68 IN | BODY MASS INDEX: 27.28 KG/M2

## 2025-01-21 DIAGNOSIS — F33.1 MODERATE EPISODE OF RECURRENT MAJOR DEPRESSIVE DISORDER (HCC): ICD-10-CM

## 2025-01-21 DIAGNOSIS — F41.1 GAD (GENERALIZED ANXIETY DISORDER): ICD-10-CM

## 2025-01-21 DIAGNOSIS — S73.199A TEAR OF ACETABULAR LABRUM, UNSPECIFIED LATERALITY, INITIAL ENCOUNTER: Primary | ICD-10-CM

## 2025-01-21 DIAGNOSIS — M79.18 MYOFASCIAL PAIN SYNDROME: ICD-10-CM

## 2025-01-21 PROCEDURE — 3008F BODY MASS INDEX DOCD: CPT | Performed by: PHYSICAL MEDICINE & REHABILITATION

## 2025-01-21 PROCEDURE — 99214 OFFICE O/P EST MOD 30 MIN: CPT | Performed by: PHYSICAL MEDICINE & REHABILITATION

## 2025-01-21 NOTE — TELEPHONE ENCOUNTER
Initiated authorization for Ultrasound guided left hip joint aspriation and injection with corticosteroid. CPT/HCPCS 26707,  dx:S73.199A  with [employer]    Status: Approved - no action required  Reference/Authorization # [id number]  Valid: 1/21/25-4/21/25  Authorization is not required based on medical necessity, however, is not a guarantee of payment and may be subject to review once claim is submitted-Covered Benefit.

## 2025-01-21 NOTE — PROGRESS NOTES
[facility]  OFFICE FOLLOW UP EVALUATION      HISTORY OF PRESENT ILLNESS:     Chief Complaint   Patient presents with    Follow - Up     LOV 12/5/24 pt is here for a follow up after MRI . MRI of left and right hip were completed 12/24/24 . NO N/T. Not taking any pain meds or muscle relaxer's. Currently in physical therapy. Reports pain to be stabbing , sharp. Aching and also burning sensations. Pain 5/10       History of Present Illness  Marko, a 39-year-old male with a history of depression and anxiety, presents for follow-up evaluation of bilateral hip pain after MRI imaging. He reports persistent pain in multiple locations including his back, front and back of both hips, groin, interior knee on both sides, and shoulders. The pain in his groin, which radiates up from his pelvic floor, is particularly severe and can prevent him from exercising or doing physical therapy. He also experiences significant pain in his shoulders, both in the form of impingement pain and pain through the posterior shoulders bilaterally.    Marko has been on Cymbalta for muscular pains but discontinued due to psychiatric effects and did not experience any pain relief from it. He is currently on Trintellix for depression and anxiety. He has been seeing a chiropractor and a physical therapist, and finds massage helpful. He has also tried cupping, which he reports has helped.      PHYSICAL EXAM:   Ht 68\"   Wt 180 lb (81.6 kg)   BMI 27.37 kg/m²     Gait  Able to toe walk and heel walk without any difficulty     LUMBAR SPINE:  Inspection: no erythema, swelling, or obvious deformity.  Their iliac crest and shoulder heights are symmetrical.     Palpation: Non tender to palpation of the spinous process.   ROM: FAROM  Strength: 5/5 in bilateral lower extremities  FADIR: Positive  Mark: Negative  Sensation: Intact to light touch in all dermatomes of the lower extremities  Reflexes: 2/4 at L4 and  S1  Facet Loading: no specific facet pain  Straight leg raise: negative for radicular pain symptoms  Slump test: negative for pain symptoms for radicular pain symptoms    IMAGING:   MRI bilateral hips completed 12/24/2024 was personally reviewed which is notable for mild degenerative changes of both hips with cam type femoral acetabular impingement and left superior labral tear.  There is slight irregularity of the right acetabular labrum.  There is mild left adductor tendinosis.  There is mild bilateral proximal hamstring tendinosis.    All imaging results were reviewed and discussed with patient.      ASSESSMENT/PLAN:     1. Tear of acetabular labrum, unspecified laterality, initial encounter    2. Moderate episode of recurrent major depressive disorder (HCC)    3. ELSI (generalized anxiety disorder)        Assessment & Plan  Multifocal musculoskeletal pain  Pain in multiple locations including axial spine, anterior and posterior hips, posterior shoulders bilaterally, and anterior knees. No significant relief from Cymbalta trial, which was discontinued due to psychiatric side effects. Currently managed with physical therapy, chiropractic care, and massage therapy.  -Continue current management strategies.  -Consider acupuncture referral.    Left hip pain  MRI shows evidence of impingement and a possible labral tear. Pain is significant and can limit physical activity.  -Plan for a diagnostic and potentially therapeutic left hip joint injection.  -Consider orthopedic consultation if pain persists or worsens after injection.    Depression and anxiety  Managed with Vortioxetine (Trintellix) under the care of a psychiatrist.  -Continue current psychiatric management.      The patient verbalized understanding with the plan and was in agreement. All questions/concerns were addressed and there were no barriers to learning.  Please note Dragon dictation software was used to dictate this note and may result in inadvertent  mayte.    Eyad Saldaña DO, FAAPMR & CAQSM  Physical Medicine and Rehabilitation  Sports and Spine Medicine    PAST MEDICAL HISTORY:     Past Medical History:    Anxiety    Borderline personality disorder in adult (HCC)    Coccyx pain    Last Assessment & Plan:   Formatting of this note might be different from the original.  Active   Advised OTC ibuprofen   Continue use oc coccyx pillow   Hx of negative XR per patient   F/u if sx persist or worsen      Depression    Fibromyalgia    This was a bullshit diagnosis given for my back pain, but it was given by 3 separate orthopedics    Right hip pain    Shoulder pain, bilateral    Visual impairment         PAST SURGICAL HISTORY:     Past Surgical History:   Procedure Laterality Date    Upper gi endoscopy,exam           CURRENT MEDICATIONS:     Current Outpatient Medications   Medication Sig Dispense Refill    albuterol 108 (90 Base) MCG/ACT Inhalation Aero Soln       azelastine 0.1 % Nasal Solution 2 sprays by Nasal route 2 (two) times daily. 30 mL 0    DULoxetine (CYMBALTA) 20 MG Oral Cap DR Particles Take 1 capsule (20 mg total) by mouth daily. 30 capsule 1    QUEtiapine (SEROQUEL) 25 MG Oral Tab Take 1 tablet (25 mg total) by mouth nightly. 30 tablet 1    risperiDONE (RISPERDAL) 0.5 MG Oral Tab Take one tab qam and two tabs at bedtime po. This is an increase. 90 tablet 1    lisdexamfetamine (VYVANSE) 20 MG Oral Cap Take 1 capsule (20 mg total) by mouth every morning. 30 capsule 0    cyclobenzaprine 5 MG Oral Tab Take 1 tablet (5 mg total) by mouth nightly as needed. 10 tablet 0    Pseudoephedrine HCl, Deter, 30 MG Oral Tablet Abuse-Deterrent Take by mouth.      lisdexamfetamine (VYVANSE) 20 MG Oral Cap Take 1 capsule (20 mg total) by mouth every morning. Generic or brand whichever is covered 30 capsule 0    tadalafil 5 MG Oral Tab Take 1 tablet (5 mg total) by mouth daily.      NON FORMULARY Jeni Yepez      omega-3 fatty acids 1000 MG Oral Cap       Misc Natural  Products (GLUCOSAMINE CHOND CMP ADVANCED OR)            ALLERGIES:   Allergies[1]      FAMILY HISTORY:     Family History   Problem Relation Age of Onset    Diabetes Father     Colon Cancer Paternal Grandfather     Diabetes Paternal Grandfather           SOCIAL HISTORY:     Social History     Socioeconomic History    Marital status: Single   Tobacco Use    Smoking status: Never     Passive exposure: Never    Smokeless tobacco: Never    Tobacco comments:     Never - Have been exposed to SIGNIFICANT second hand spoke from childbirth by parents   Vaping Use    Vaping status: Never Used   Substance and Sexual Activity    Alcohol use: Not Currently     Comment: Quit July 11th    Drug use: Not Currently     Types: Cannabis, LSD, Psilocybin     Comment: Currently sober, bit have used in the past 1/month     Social Drivers of Health     Food Insecurity: No Food Insecurity (1/9/2025)    NCSS - Food Insecurity     Worried About Running Out of Food in the Last Year: No     Ran Out of Food in the Last Year: No   Transportation Needs: No Transportation Needs (1/9/2025)    NCSS - Transportation     Lack of Transportation: No    Received from [facility] and [facility]    [facility]   Housing Stability: Not At Risk (1/9/2025)    NCSS - Housing/Utilities     Has Housing: Yes     Worried About Losing Housing: No     Unable to Get Utilities: No          REVIEW OF SYSTEMS:   A comprehensive 10 point review of systems was completed.  Pertinent positives and negatives noted in the the HPI.      LABS:     Lab Results   Component Value Date     09/19/2024    A1C 5.3 09/19/2024     Lab Results   Component Value Date    WBC 5.0 09/19/2024    RBC 4.75 09/19/2024    HGB 14.7 09/19/2024    HCT 44.8 09/19/2024    MCV 94.3 09/19/2024    MCH 30.9 09/19/2024    MCHC 32.8 09/19/2024    RDW 13.1 09/19/2024    .0 09/19/2024     Lab Results   Component Value Date    GLU 86 09/19/2024    BUN 21  09/19/2024    BUNCREA 20.4 (H) 09/19/2024    CREATSERUM 1.03 09/19/2024    ANIONGAP 2 09/19/2024    CA 9.4 09/19/2024    OSMOCALC 296 (H) 09/19/2024    ALKPHO 54 09/19/2024    AST 21 09/19/2024    ALT 21 09/19/2024    BILT 0.5 09/19/2024    TP 6.9 09/19/2024    ALB 4.4 09/19/2024    GLOBULIN 2.5 09/19/2024    AGRATIO 1.9 04/12/2007     09/19/2024    K 4.6 09/19/2024     09/19/2024    CO2 31.0 09/19/2024     No results found for: \"PTP\", \"PT\", \"INR\"  No results found for: \"VITD\", \"QVITD\", \"VZRA26PB\"           [1]   Allergies  Allergen Reactions    Seasonal OTHER (SEE COMMENTS)

## 2025-01-21 NOTE — PROGRESS NOTES
The following individual(s) verbally consented to be recorded using ambient AI listening technology and understand that they can each withdraw their consent to this listening technology at any point by asking the clinician to turn off or pause the recording: TRISTEN FRIAS

## 2025-05-20 ENCOUNTER — OFFICE VISIT (OUTPATIENT)
Dept: INTERNAL MEDICINE CLINIC | Facility: CLINIC | Age: 40
End: 2025-05-20
Payer: COMMERCIAL

## 2025-05-20 ENCOUNTER — LAB ENCOUNTER (OUTPATIENT)
Dept: LAB | Age: 40
End: 2025-05-20
Attending: NURSE PRACTITIONER
Payer: COMMERCIAL

## 2025-05-20 VITALS
HEIGHT: 68 IN | DIASTOLIC BLOOD PRESSURE: 78 MMHG | WEIGHT: 191 LBS | SYSTOLIC BLOOD PRESSURE: 118 MMHG | BODY MASS INDEX: 28.95 KG/M2 | HEART RATE: 50 BPM

## 2025-05-20 DIAGNOSIS — K21.9 GASTROESOPHAGEAL REFLUX DISEASE, UNSPECIFIED WHETHER ESOPHAGITIS PRESENT: ICD-10-CM

## 2025-05-20 DIAGNOSIS — Z11.3 VENEREAL DISEASE SCREENING: ICD-10-CM

## 2025-05-20 DIAGNOSIS — Z13.29 THYROID DISORDER SCREEN: ICD-10-CM

## 2025-05-20 DIAGNOSIS — D72.819 LEUKOPENIA, UNSPECIFIED TYPE: ICD-10-CM

## 2025-05-20 DIAGNOSIS — Z13.0 SCREENING FOR DEFICIENCY ANEMIA: ICD-10-CM

## 2025-05-20 DIAGNOSIS — Z00.00 WELLNESS EXAMINATION: ICD-10-CM

## 2025-05-20 DIAGNOSIS — R30.0 DYSURIA: Primary | ICD-10-CM

## 2025-05-20 DIAGNOSIS — Z13.220 LIPID SCREENING: ICD-10-CM

## 2025-05-20 DIAGNOSIS — R30.0 DYSURIA: ICD-10-CM

## 2025-05-20 LAB
ALBUMIN SERPL-MCNC: 4.7 G/DL (ref 3.2–4.8)
ALBUMIN/GLOB SERPL: 2 {RATIO} (ref 1–2)
ALP LIVER SERPL-CCNC: 46 U/L (ref 45–117)
ALT SERPL-CCNC: 16 U/L (ref 10–49)
ANION GAP SERPL CALC-SCNC: 7 MMOL/L (ref 0–18)
AST SERPL-CCNC: 20 U/L (ref ?–34)
BASOPHILS # BLD AUTO: 0.04 X10(3) UL (ref 0–0.2)
BASOPHILS NFR BLD AUTO: 0.9 %
BILIRUB SERPL-MCNC: 0.4 MG/DL (ref 0.3–1.2)
BILIRUB UR QL: NEGATIVE
BILIRUBIN: NEGATIVE
BUN BLD-MCNC: 21 MG/DL (ref 9–23)
BUN/CREAT SERPL: 19.1 (ref 10–20)
CALCIUM BLD-MCNC: 9.4 MG/DL (ref 8.7–10.4)
CHLORIDE SERPL-SCNC: 102 MMOL/L (ref 98–112)
CHOLEST SERPL-MCNC: 164 MG/DL (ref ?–200)
CLARITY UR: CLEAR
CO2 SERPL-SCNC: 30 MMOL/L (ref 21–32)
COLOR UR: COLORLESS
COMPLEXED PSA SERPL-MCNC: 0.41 NG/ML (ref ?–4)
CREAT BLD-MCNC: 1.1 MG/DL (ref 0.7–1.3)
DEPRECATED RDW RBC AUTO: 43.1 FL (ref 35.1–46.3)
EGFRCR SERPLBLD CKD-EPI 2021: 89 ML/MIN/1.73M2 (ref 60–?)
EOSINOPHIL # BLD AUTO: 0.09 X10(3) UL (ref 0–0.7)
EOSINOPHIL NFR BLD AUTO: 1.9 %
ERYTHROCYTE [DISTWIDTH] IN BLOOD BY AUTOMATED COUNT: 12.4 % (ref 11–15)
FASTING PATIENT LIPID ANSWER: NO
FASTING STATUS PATIENT QL REPORTED: NO
GLOBULIN PLAS-MCNC: 2.3 G/DL (ref 2–3.5)
GLUCOSE (URINE DIPSTICK): NEGATIVE MG/DL
GLUCOSE BLD-MCNC: 87 MG/DL (ref 70–99)
GLUCOSE UR-MCNC: NORMAL MG/DL
HAV AB SER QL IA: REACTIVE
HAV IGM SER QL: NONREACTIVE
HBV CORE AB SERPL QL IA: NONREACTIVE
HBV SURFACE AB SER QL: REACTIVE
HBV SURFACE AB SERPL IA-ACNC: >1000 MIU/ML
HBV SURFACE AG SERPL QL IA: NONREACTIVE
HCT VFR BLD AUTO: 41.6 % (ref 39–53)
HCV AB SERPL QL IA: NONREACTIVE
HDLC SERPL-MCNC: 64 MG/DL (ref 40–59)
HGB BLD-MCNC: 13.8 G/DL (ref 13–17.5)
HGB UR QL STRIP.AUTO: NEGATIVE
IMM GRANULOCYTES # BLD AUTO: 0.01 X10(3) UL (ref 0–1)
IMM GRANULOCYTES NFR BLD: 0.2 %
KETONES (URINE DIPSTICK): NEGATIVE MG/DL
KETONES UR-MCNC: NEGATIVE MG/DL
LDLC SERPL CALC-MCNC: 85 MG/DL (ref ?–100)
LEUKOCYTE ESTERASE UR QL STRIP.AUTO: NEGATIVE
LEUKOCYTES: NEGATIVE
LYMPHOCYTES # BLD AUTO: 1.41 X10(3) UL (ref 1–4)
LYMPHOCYTES NFR BLD AUTO: 30.3 %
MCH RBC QN AUTO: 30.9 PG (ref 26–34)
MCHC RBC AUTO-ENTMCNC: 33.2 G/DL (ref 31–37)
MCV RBC AUTO: 93.1 FL (ref 80–100)
MONOCYTES # BLD AUTO: 0.4 X10(3) UL (ref 0.1–1)
MONOCYTES NFR BLD AUTO: 8.6 %
MULTISTIX LOT#: NORMAL NUMERIC
NEUTROPHILS # BLD AUTO: 2.7 X10 (3) UL (ref 1.5–7.7)
NEUTROPHILS # BLD AUTO: 2.7 X10(3) UL (ref 1.5–7.7)
NEUTROPHILS NFR BLD AUTO: 58.1 %
NITRITE UR QL STRIP.AUTO: NEGATIVE
NITRITE, URINE: NEGATIVE
NONHDLC SERPL-MCNC: 100 MG/DL (ref ?–130)
OCCULT BLOOD: NEGATIVE
OSMOLALITY SERPL CALC.SUM OF ELEC: 290 MOSM/KG (ref 275–295)
PH UR: 5 [PH] (ref 5–8)
PH, URINE: 5.5 (ref 4.5–8)
PLATELET # BLD AUTO: 198 10(3)UL (ref 150–450)
POTASSIUM SERPL-SCNC: 4.1 MMOL/L (ref 3.5–5.1)
PROT SERPL-MCNC: 7 G/DL (ref 5.7–8.2)
PROT UR-MCNC: NEGATIVE MG/DL
PROTEIN (URINE DIPSTICK): NEGATIVE MG/DL
RBC # BLD AUTO: 4.47 X10(6)UL (ref 4.3–5.7)
SODIUM SERPL-SCNC: 139 MMOL/L (ref 136–145)
SP GR UR STRIP: 1.01 (ref 1–1.03)
SPECIFIC GRAVITY: 1.01 (ref 1–1.03)
T PALLIDUM AB SER QL IA: NONREACTIVE
TRIGL SERPL-MCNC: 81 MG/DL (ref 30–149)
TSI SER-ACNC: 0.99 UIU/ML (ref 0.55–4.78)
URINE-COLOR: YELLOW
UROBILINOGEN UR STRIP-ACNC: NORMAL
UROBILINOGEN,SEMI-QN: 0.2 MG/DL (ref 0–1.9)
VLDLC SERPL CALC-MCNC: 13 MG/DL (ref 0–30)
WBC # BLD AUTO: 4.7 X10(3) UL (ref 4–11)

## 2025-05-20 PROCEDURE — 87798 DETECT AGENT NOS DNA AMP: CPT

## 2025-05-20 PROCEDURE — 81002 URINALYSIS NONAUTO W/O SCOPE: CPT | Performed by: NURSE PRACTITIONER

## 2025-05-20 PROCEDURE — 86709 HEPATITIS A IGM ANTIBODY: CPT

## 2025-05-20 PROCEDURE — 86780 TREPONEMA PALLIDUM: CPT

## 2025-05-20 PROCEDURE — 36415 COLL VENOUS BLD VENIPUNCTURE: CPT

## 2025-05-20 PROCEDURE — 99214 OFFICE O/P EST MOD 30 MIN: CPT | Performed by: NURSE PRACTITIONER

## 2025-05-20 PROCEDURE — 80061 LIPID PANEL: CPT

## 2025-05-20 PROCEDURE — 80503 PATH CLIN CONSLTJ SF 5-20: CPT

## 2025-05-20 PROCEDURE — 86708 HEPATITIS A ANTIBODY: CPT

## 2025-05-20 PROCEDURE — 80053 COMPREHEN METABOLIC PANEL: CPT

## 2025-05-20 PROCEDURE — 3074F SYST BP LT 130 MM HG: CPT | Performed by: NURSE PRACTITIONER

## 2025-05-20 PROCEDURE — 86704 HEP B CORE ANTIBODY TOTAL: CPT

## 2025-05-20 PROCEDURE — 3008F BODY MASS INDEX DOCD: CPT | Performed by: NURSE PRACTITIONER

## 2025-05-20 PROCEDURE — 3078F DIAST BP <80 MM HG: CPT | Performed by: NURSE PRACTITIONER

## 2025-05-20 PROCEDURE — 84443 ASSAY THYROID STIM HORMONE: CPT

## 2025-05-20 PROCEDURE — 87389 HIV-1 AG W/HIV-1&-2 AB AG IA: CPT

## 2025-05-20 PROCEDURE — 86803 HEPATITIS C AB TEST: CPT

## 2025-05-20 PROCEDURE — 85025 COMPLETE CBC W/AUTO DIFF WBC: CPT

## 2025-05-20 PROCEDURE — 87340 HEPATITIS B SURFACE AG IA: CPT

## 2025-05-20 PROCEDURE — 86706 HEP B SURFACE ANTIBODY: CPT

## 2025-05-20 NOTE — PROGRESS NOTES
Marko Kim is a 39 year old male.  HPI:   Patient reports his female partner has been experiencing recurrent UTIs and has requested he get tested.  Patient reports he has multiple partners in the past and would like to complete urine testing as well as STD testing.  He denies any concerns currently for STDs.  He reports he has had chronic lower abdominal pressure with urination and has seen urology in the past for this.  Reports he was recommended at treatment to take daily but did not feel well after using it, stopped it, did not f/u with Urology. He denies any fever, chills, back pain, flank pain, NVDC, dizziness, blood in urine, decrease output, changes in flow, nocturia. Taking Tadalafil for ED from online \"For him\" site.  Reports chronic GERD, states he was placed on a PPI but continues to have symptoms despite treatment.  No nausea or vomiting diarrhea or constipation, appetite or weight changes.  Has tried dietary modifications,  Current Medications[1]   Past Medical History[2]   Social History:  Short Social Hx on File[3]     REVIEW OF SYSTEMS:   Review of Systems   Constitutional:  Negative for activity change, appetite change, chills, diaphoresis, fatigue, fever and unexpected weight change.   HENT:  Negative for congestion.    Eyes:  Negative for visual disturbance.   Respiratory:  Negative for cough, chest tightness, shortness of breath and wheezing.    Cardiovascular:  Negative for chest pain, palpitations and leg swelling.   Gastrointestinal:  Negative for abdominal pain, constipation, diarrhea, nausea and vomiting.   Endocrine: Negative.    Genitourinary:  Negative for difficulty urinating and frequency.   Musculoskeletal:  Negative for arthralgias and back pain.   Skin: Negative.    Neurological:  Negative for dizziness, seizures, numbness and headaches.   Psychiatric/Behavioral: Negative.            EXAM:   /78 (BP Location: Right arm, Patient Position: Sitting, Cuff Size: adult)    Pulse 50   Ht 5' 8\" (1.727 m)   Wt 191 lb (86.6 kg)   BMI 29.04 kg/m²     Physical Exam  Constitutional:       General: He is not in acute distress.  Cardiovascular:      Rate and Rhythm: Regular rhythm. Bradycardia present.      Pulses: Normal pulses.   Pulmonary:      Effort: Pulmonary effort is normal. No respiratory distress.   Abdominal:      General: There is no distension.      Tenderness: There is no abdominal tenderness. There is no right CVA tenderness or left CVA tenderness.   Skin:     General: Skin is warm.      Coloration: Skin is not jaundiced.   Neurological:      Mental Status: He is alert and oriented to person, place, and time.   Psychiatric:         Thought Content: Thought content normal.         Judgment: Judgment normal.            ASSESSMENT AND PLAN:   Assessment & Plan  Dysuria   -Chronic, advised to schedule f/u with Urology.  -Reports partner has recurrent UTI's and requests testing.   -labs per orders.     GERD   -Persisting GERD sx despite PPI use.   -Referral to GI. Would benefit from EGD  -Continue PPI for now.     Veneral Disease Screen  -STI prevention.   -Screening ordered per patient request, no complaints or concerns today.               The patient indicates understanding of these issues and agrees to the plan.  The patient is asked to return in PRN.     The above note was creating using Dragon speech recognition technology. Please excuse any typos.       [1]   Current Outpatient Medications   Medication Sig Dispense Refill    lisdexamfetamine (VYVANSE) 20 MG Oral Cap Take 1 capsule (20 mg total) by mouth every morning. 30 capsule 0    [START ON 5/31/2025] lisdexamfetamine (VYVANSE) 20 MG Oral Cap Take 1 capsule (20 mg total) by mouth every morning. 30 capsule 0    buPROPion ER (WELLBUTRIN XL) 150 MG Oral Tablet 24 Hr Take 1 tablet (150 mg total) by mouth every morning. 90 tablet 1    lisdexamfetamine (VYVANSE) 20 MG Oral Cap Take 1 capsule (20 mg total) by mouth every  morning. Generic or brand whichever is covered 30 capsule 0    QUEtiapine (SEROQUEL) 25 MG Oral Tab Take 1 tablet (25 mg total) by mouth nightly. 30 tablet 1    risperiDONE (RISPERDAL) 0.5 MG Oral Tab Take one tab qam and two tabs at bedtime po. This is an increase. 270 tablet 1    vortioxetine 10 MG Oral Tab Take 1 tablet (10 mg total) by mouth daily. 90 tablet 2    albuterol 108 (90 Base) MCG/ACT Inhalation Aero Soln       azelastine 0.1 % Nasal Solution 2 sprays by Nasal route 2 (two) times daily. 30 mL 0    lisdexamfetamine (VYVANSE) 20 MG Oral Cap Take 1 capsule (20 mg total) by mouth every morning. 30 capsule 0    cyclobenzaprine 5 MG Oral Tab Take 1 tablet (5 mg total) by mouth nightly as needed. 10 tablet 0    Pseudoephedrine HCl, Deter, 30 MG Oral Tablet Abuse-Deterrent Take by mouth.      tadalafil 5 MG Oral Tab Take 1 tablet (5 mg total) by mouth daily.      NON FORMULARY Jeni Yepez      omega-3 fatty acids 1000 MG Oral Cap       Misc Natural Products (GLUCOSAMINE CHOND CMP ADVANCED OR)      [2]   Past Medical History:   Allergic rhinitis    Anxiety    Benign prostatic hyperplasia    Maybe? I was told that I had an overlarge prostate. Not sure    Borderline personality disorder in adult (HCC)    Coccyx pain    Last Assessment & Plan:   Formatting of this note might be different from the original.  Active   Advised OTC ibuprofen   Continue use oc coccyx pillow   Hx of negative XR per patient   F/u if sx persist or worsen      Constipation    Rarely    Depression    Esophageal reflux    Fibromyalgia    This was a bullshit diagnosis given for my back pain, but it was given by 3 separate orthopedics    Gastrointestinal bleeding    Vomiting with bleeding caused by kaur much bile from liver entering stomach via malfunctioning valve, caused gastritis    Hemorrhoids    Very often    Obesity    Previous issue, in remission    Right hip pain    Shoulder pain, bilateral    Visual impairment   [3]   Social  History  Socioeconomic History    Marital status: Single   Tobacco Use    Smoking status: Never     Passive exposure: Never    Smokeless tobacco: Never    Tobacco comments:     Never - Have been exposed to SIGNIFICANT second hand spoke from childbirth by parents   Vaping Use    Vaping status: Never Used   Substance and Sexual Activity    Alcohol use: Not Currently     Comment: Quit July 11th    Drug use: Not Currently     Types: Cannabis, LSD, Psilocybin     Comment: Currently sober, bit have used in the past 1/month     Social Drivers of Health     Food Insecurity: No Food Insecurity (1/9/2025)    NCSS - Food Insecurity     Worried About Running Out of Food in the Last Year: No     Ran Out of Food in the Last Year: No   Transportation Needs: No Transportation Needs (1/9/2025)    NCSS - Transportation     Lack of Transportation: No   Housing Stability: Not At Risk (1/9/2025)    NCSS - Housing/Utilities     Has Housing: Yes     Worried About Losing Housing: No     Unable to Get Utilities: No

## 2025-05-21 LAB
C TRACH DNA SPEC QL NAA+PROBE: NEGATIVE
N GONORRHOEA DNA SPEC QL NAA+PROBE: NEGATIVE

## 2025-05-23 LAB — MYCOPLASMA HOMINIS BY PCR: NEGATIVE

## (undated) NOTE — LETTER
WHERE IS YOUR PAIN NOW?  Frida the areas on your body where you feel the described sensations.  Use the appropriate symbol.  Frida the areas of radiation.  Include all affected areas.  Just to complete the picture, please draw in the face.     ACHE:  ^ ^ ^   NUMBNESS:  0000   PINS & NEEDLES:  = = = =                              ^ ^ ^                       0000              = = = =                                    ^ ^ ^                       0000            = = = =      BURNING:  XXXX   STABBING: ////                  XXXX                ////                         XXXX          ////     Please frida the line below indicating your degree of pain right now  with 0 being no pain 10 being the worst pain possible.                                         0             1             2              3             4              5              6              7             8             9             10         Patient Signature: